# Patient Record
Sex: MALE | Race: WHITE | NOT HISPANIC OR LATINO | Employment: OTHER | URBAN - METROPOLITAN AREA
[De-identification: names, ages, dates, MRNs, and addresses within clinical notes are randomized per-mention and may not be internally consistent; named-entity substitution may affect disease eponyms.]

---

## 2023-05-28 DIAGNOSIS — G31.84 MILD COGNITIVE IMPAIRMENT OF UNCERTAIN OR UNKNOWN ETIOLOGY: ICD-10-CM

## 2023-05-31 RX ORDER — MEMANTINE HYDROCHLORIDE 10 MG/1
TABLET ORAL
Qty: 180 TABLET | Refills: 3 | Status: SHIPPED | OUTPATIENT
Start: 2023-05-31 | End: 2023-06-27 | Stop reason: ALTCHOICE

## 2023-06-27 ENCOUNTER — OFFICE VISIT (OUTPATIENT)
Dept: PRIMARY CARE | Facility: CLINIC | Age: 88
End: 2023-06-27
Payer: MEDICARE

## 2023-06-27 VITALS
BODY MASS INDEX: 20.05 KG/M2 | HEART RATE: 80 BPM | OXYGEN SATURATION: 95 % | DIASTOLIC BLOOD PRESSURE: 71 MMHG | WEIGHT: 128 LBS | SYSTOLIC BLOOD PRESSURE: 114 MMHG

## 2023-06-27 DIAGNOSIS — R64 CACHECTIC (MULTI): Primary | ICD-10-CM

## 2023-06-27 DIAGNOSIS — F02.B3 MODERATE DEMENTIA ASSOCIATED WITH OTHER UNDERLYING DISEASE, WITH MOOD DISTURBANCE (MULTI): ICD-10-CM

## 2023-06-27 DIAGNOSIS — N18.31 CKD STAGE G3A/A1, GFR 45-59 AND ALBUMIN CREATININE RATIO <30 MG/G (MULTI): ICD-10-CM

## 2023-06-27 PROCEDURE — 1159F MED LIST DOCD IN RCRD: CPT | Performed by: FAMILY MEDICINE

## 2023-06-27 PROCEDURE — 1036F TOBACCO NON-USER: CPT | Performed by: FAMILY MEDICINE

## 2023-06-27 PROCEDURE — 99213 OFFICE O/P EST LOW 20 MIN: CPT | Performed by: FAMILY MEDICINE

## 2023-06-27 RX ORDER — LEVOTHYROXINE SODIUM 50 UG/1
50 TABLET ORAL
COMMUNITY
End: 2023-10-08

## 2023-06-27 ASSESSMENT — PATIENT HEALTH QUESTIONNAIRE - PHQ9
SUM OF ALL RESPONSES TO PHQ9 QUESTIONS 1 AND 2: 0
1. LITTLE INTEREST OR PLEASURE IN DOING THINGS: NOT AT ALL
2. FEELING DOWN, DEPRESSED OR HOPELESS: NOT AT ALL

## 2023-06-27 NOTE — PROGRESS NOTES
Subjective   Ed Howe is a 88 y.o. male who presents for No chief complaint on file..  Seen in Jan 2023 for his MAW     HPI  Supplements : Centrum , Fish oils, Homocysteine, and Vit D  accompanied by wife, we spoke on the phone prior to his appointment  NKDA  Labs last on 9/23/19- 10/16/20- 5/25/22  - pt stopped all medication except levothyroxine, more often gets aggressive with swearing and occ throwing things    Denies chest pain pain or abdominal pains.     #) Alzheimers dementia - is declining   - pt could not recall his age, or the month or year   - wife is getting worried about the future   - is getting help from 2 daughters who live locally   - no self harm behaviors  - Mild cognitive impairment with cerebrovascular disease   - carotid stenosis   - follows with Dr. Duong, Neuro about one year ago and now with geriatric psychiatrist   - still doing ADLS  - on donepezil 10 mg daily - only taking 5 ...will add memantidine to the regiment -- STOPPED both of these a couple of weeks ago (weaned off)    #) Elevated PSA- urologist thinks it is ok  - no follow up needs, no longer checking PSA     #) Hypothyroidism   - is on the levothyroxine 50 mcg daily , getting every AM (chews it) but is taking daily.    Low appetite and weight loss    ROS was completed and all systems are negative with the exception of what was noted in the the HPI.     Objective     There were no vitals taken for this visit.     Physical Exam    Thin  Responds when asked but looks to wife to do the answering    Assessment/Plan   Problem List Items Addressed This Visit    None    Please continue the levothyroxine.     The blood pressure is good today     We reviewed the blood work from January, which looked good.     Your appetite might improve with stopping the memory medications.     Continue to get good sleep and stay on a routine.     Weight is up 3#     Please let me know if you need any more assistance in the home.     Follow up in 3  months or sooner as need.        Avani Denis DO, MSMed, ABOM  [address]   [address]   [place] [address]  Ph. [phone]  Fx. [phone]

## 2023-06-27 NOTE — PATIENT INSTRUCTIONS
Please continue the levothyroxine.     The blood pressure is good today     We reviewed the blood work from January, which looked good.     Your appetite might improve with stopping the memory medications.     Continue to get good sleep and stay on a routine.     Weight is up 3#     Please let me know if you need any more assistance in the home.     Follow up in 3 months or sooner as need.

## 2023-07-05 PROBLEM — N11.9 CHRONIC TUBULO-INTERSTITIAL NEPHRITIS: Status: ACTIVE | Noted: 2023-07-05

## 2023-07-05 PROBLEM — I67.9 DISTURBANCES OF VISION DUE TO CEREBROVASCULAR DISEASE: Status: ACTIVE | Noted: 2023-07-05

## 2023-07-05 PROBLEM — R63.4 WEIGHT LOSS, UNINTENTIONAL: Status: ACTIVE | Noted: 2023-07-05

## 2023-07-05 PROBLEM — R97.20 ELEVATED PSA: Status: ACTIVE | Noted: 2023-07-05

## 2023-07-05 PROBLEM — E78.5 DYSLIPIDEMIA: Status: ACTIVE | Noted: 2023-07-05

## 2023-07-05 PROBLEM — H53.9 DISTURBANCES OF VISION DUE TO CEREBROVASCULAR DISEASE: Status: ACTIVE | Noted: 2023-07-05

## 2023-07-05 PROBLEM — H90.3 SENSORINEURAL HEARING LOSS, BILATERAL: Status: ACTIVE | Noted: 2023-07-05

## 2023-07-05 PROBLEM — H93.13 TINNITUS OF BOTH EARS: Status: ACTIVE | Noted: 2023-07-05

## 2023-07-05 PROBLEM — I65.29 CAROTID ARTERY STENOSIS: Status: ACTIVE | Noted: 2023-07-05

## 2023-07-05 PROBLEM — E05.90 HYPERTHYROIDISM: Status: ACTIVE | Noted: 2023-07-05

## 2023-07-05 PROBLEM — I10 HYPERTENSION: Status: ACTIVE | Noted: 2023-07-05

## 2023-07-06 NOTE — PROGRESS NOTES
"So sorry for troubling you, there are most definitely less disturbing diagnosis for sure, they are likely have the same billing code and sometimes the computers suggest alternative verbiage.  The cachectic term is related to a person that is rapidly losing or has lost weight and is below a healthy BMI. My hops is as he gains some weight back this will no longer be the case. We we closely monitoring the weight loss though. The moderate dementia is not just \"Alzheimer's\" since he does have risk for small vessel/ vascular dementia from previous mild stroke, stenosis of the carotids, elevated blood pressure and cholesterol in the past and the chronic disease, this is what was meant by the \"associated with other underlying conditions\". I hope that helps.   "

## 2023-08-15 ENCOUNTER — CLINICAL SUPPORT (OUTPATIENT)
Dept: PRIMARY CARE | Facility: CLINIC | Age: 88
End: 2023-08-15
Payer: MEDICARE

## 2023-08-15 ENCOUNTER — TELEPHONE (OUTPATIENT)
Dept: PRIMARY CARE | Facility: CLINIC | Age: 88
End: 2023-08-15

## 2023-08-15 DIAGNOSIS — K04.7 TOOTH INFECTION: Primary | ICD-10-CM

## 2023-08-15 PROCEDURE — 96372 THER/PROPH/DIAG INJ SC/IM: CPT | Performed by: FAMILY MEDICINE

## 2023-08-15 RX ORDER — CEFTRIAXONE 1 G/1
1 INJECTION, POWDER, FOR SOLUTION INTRAMUSCULAR; INTRAVENOUS ONCE
Status: COMPLETED | OUTPATIENT
Start: 2023-08-15 | End: 2023-08-15

## 2023-08-15 RX ADMIN — CEFTRIAXONE 1 G: 1 INJECTION, POWDER, FOR SOLUTION INTRAMUSCULAR; INTRAVENOUS at 14:23

## 2023-08-15 NOTE — TELEPHONE ENCOUNTER
Pt's wife called asking for help.     States the dentist told her the pt has an infection in his tooth.     She has tried to make him take liquid and pills but due to the pt's dementia, he refuses and she is worried the infection will not get any better.     She was asking for an injection of an antibiotic, asking to be seen today.

## 2023-08-15 NOTE — PROGRESS NOTES
Patient came in today to receive Ceftriaxone in left buttock - tolerated well  Lot [id number]   Exp 5/2024   NDC [address]-46

## 2023-10-03 ENCOUNTER — APPOINTMENT (OUTPATIENT)
Dept: PRIMARY CARE | Facility: CLINIC | Age: 88
End: 2023-10-03
Payer: MEDICARE

## 2023-10-06 DIAGNOSIS — E05.90 THYROTOXICOSIS, UNSPECIFIED WITHOUT THYROTOXIC CRISIS OR STORM: ICD-10-CM

## 2023-10-08 RX ORDER — LEVOTHYROXINE SODIUM 50 UG/1
50 TABLET ORAL DAILY
Qty: 90 TABLET | Refills: 3 | Status: SHIPPED | OUTPATIENT
Start: 2023-10-08

## 2023-10-23 ENCOUNTER — OFFICE VISIT (OUTPATIENT)
Dept: PRIMARY CARE | Facility: CLINIC | Age: 88
End: 2023-10-23
Payer: MEDICARE

## 2023-10-23 VITALS
DIASTOLIC BLOOD PRESSURE: 74 MMHG | HEART RATE: 72 BPM | SYSTOLIC BLOOD PRESSURE: 128 MMHG | BODY MASS INDEX: 19.89 KG/M2 | OXYGEN SATURATION: 97 % | WEIGHT: 127 LBS

## 2023-10-23 DIAGNOSIS — I10 PRIMARY HYPERTENSION: ICD-10-CM

## 2023-10-23 DIAGNOSIS — E55.9 AVITAMINOSIS D: ICD-10-CM

## 2023-10-23 DIAGNOSIS — Z00.00 ROUTINE GENERAL MEDICAL EXAMINATION AT HEALTH CARE FACILITY: ICD-10-CM

## 2023-10-23 DIAGNOSIS — F02.B3 MODERATE DEMENTIA ASSOCIATED WITH OTHER UNDERLYING DISEASE, WITH MOOD DISTURBANCE (MULTI): Primary | ICD-10-CM

## 2023-10-23 DIAGNOSIS — E78.5 DYSLIPIDEMIA: ICD-10-CM

## 2023-10-23 DIAGNOSIS — N18.31 CKD STAGE G3A/A1, GFR 45-59 AND ALBUMIN CREATININE RATIO <30 MG/G (MULTI): ICD-10-CM

## 2023-10-23 PROCEDURE — 1159F MED LIST DOCD IN RCRD: CPT | Performed by: FAMILY MEDICINE

## 2023-10-23 PROCEDURE — 1160F RVW MEDS BY RX/DR IN RCRD: CPT | Performed by: FAMILY MEDICINE

## 2023-10-23 PROCEDURE — 1170F FXNL STATUS ASSESSED: CPT | Performed by: FAMILY MEDICINE

## 2023-10-23 PROCEDURE — 99214 OFFICE O/P EST MOD 30 MIN: CPT | Performed by: FAMILY MEDICINE

## 2023-10-23 PROCEDURE — 90662 IIV NO PRSV INCREASED AG IM: CPT | Performed by: FAMILY MEDICINE

## 2023-10-23 PROCEDURE — 1036F TOBACCO NON-USER: CPT | Performed by: FAMILY MEDICINE

## 2023-10-23 PROCEDURE — G0008 ADMIN INFLUENZA VIRUS VAC: HCPCS | Performed by: FAMILY MEDICINE

## 2023-10-23 PROCEDURE — 3074F SYST BP LT 130 MM HG: CPT | Performed by: FAMILY MEDICINE

## 2023-10-23 PROCEDURE — 3078F DIAST BP <80 MM HG: CPT | Performed by: FAMILY MEDICINE

## 2023-10-23 ASSESSMENT — ACTIVITIES OF DAILY LIVING (ADL)
BATHING: INDEPENDENT
DOING_HOUSEWORK: TOTAL CARE
GROCERY_SHOPPING: TOTAL CARE
TAKING_MEDICATION: TOTAL CARE
DRESSING: INDEPENDENT
MANAGING_FINANCES: TOTAL CARE

## 2023-10-23 ASSESSMENT — PATIENT HEALTH QUESTIONNAIRE - PHQ9
2. FEELING DOWN, DEPRESSED OR HOPELESS: NOT AT ALL
1. LITTLE INTEREST OR PLEASURE IN DOING THINGS: NOT AT ALL
SUM OF ALL RESPONSES TO PHQ9 QUESTIONS 1 AND 2: 0

## 2023-10-23 NOTE — PROGRESS NOTES
Subjective   Reason for Visit: Valentin Howe is an 88 y.o. male here for a Medicare Wellness visit.      Reviewed all medications by prescribing practitioner or clinical pharmacist (such as prescriptions, OTCs, herbal therapies and supplements) and documented in the medical record.    HPI  Supplements : Centrum , Fish oils, Homocysteine, and Vit D  accompanied by wife, we spoke on the phone prior to his appointment  NKDA  Labs last on 9/23/19- 10/16/20- 5/25/22  - pt stopped all medication except levothyroxine, more often gets aggressive with swearing and occ throwing things    Denies chest pain pain or abdominal pains.     #) Alzheimers dementia - is declining   - pt could not recall his age, or the month or year   - wife is getting worried about the future   - is getting help from 2 daughters who live locally   - no self harm behaviors  - Mild cognitive impairment with cerebrovascular disease   - carotid stenosis   - follows with Dr. Duong, Neuro about one year ago and now with geriatric psychiatrist   - still doing ADLS  - on donepezil 10 mg daily - only taking 5 ...will add memantidine to the regiment -- STOPPED both of these a couple of weeks ago (weaned off)    #) Elevated PSA- urologist thinks it is ok  - no follow up needs, no longer checking PSA     #) Hypothyroidism   - is on the levothyroxine 50 mcg daily , getting every AM (chews it) but is taking daily.    Low appetite and weight loss    Patient Care Team:  Avani Denis DO as PCP - General  Avani Denis DO as PCP - [employer] PCP  Tyler Granados MD (Internal Medicine)     Review of Systems  ROS was completed and all systems are negative with the exception of what was noted in the the HPI.     Objective   Vitals:  /74   Pulse 72   Wt 57.6 kg (127 lb)   SpO2 97%   BMI 19.89 kg/m²       Physical Exam  Thin  Responds when asked but looks to wife to do the answering    Assessment/Plan   Problem List Items Addressed This  Visit    None

## 2023-10-23 NOTE — PATIENT INSTRUCTIONS
Please continue the levothyroxine.     The blood pressure is good today , 128/74    We reviewed the blood work from January, 2023  which looked good.     Your appetite might improve with stopping the memory medications.   Weight is stable which is great     Continue to get good sleep and stay on a routine.     Repeat fasting blood work in January 2024  ( when the weather is nice)     High dose flu shot today.     Please let me know if you need any more assistance in the home.     Follow up in 4 months or sooner as need.

## 2023-11-07 DIAGNOSIS — F02.B3 MODERATE DEMENTIA ASSOCIATED WITH OTHER UNDERLYING DISEASE, WITH MOOD DISTURBANCE (MULTI): Primary | ICD-10-CM

## 2023-11-07 NOTE — PROGRESS NOTES
"Daughter was seen for her visit   Shared with me some concerns regarding her father  She also shared with me that she does nto think her mother is currently in danger, but that could change.     She writes:   \" In his mind, he's in his twenties, usually remembers he's  but doesn't remember he has children. He recognizes daughters as being familiar somehow, but doesn't know their names, etc. He remembers his siblings but not their spouses, children, etc. \"     \"When his brother  last year, he was devastated every time someone told him. He couldn't remember for more than a few minutes\" it was like he was told for the first time every time.     \"He constantly follows my mom around the house. He is verbally abusive to my mom, using the f- word all the time when yelling at her, even in public. He never yelled at her in public before. He doesn't yet at her when the children are there. \"     \" He throws things and swears when he gets frustrated. So far, not at my mom.\"     \"He is paranoid at someone stealing the car and wont go for walks anymore t not for long so he can get back to the car.\"    \" He gets vary nervous away from home, even when my mom is with him.\"     \"He puts things in the fridge/freezer that don't belong there and puts things that belong in the fridge in cabinets.\"     \" He has started putting potato chips in his drinks and then saying his lemonade doesn't taste good.\"     \"He enjoys music, which my mom plays for him a lot but he is very frequently grumpy to agitated.\"     \" He was going to use his razor to brush his teeth. HE has an electric razor now, but my mom has to help him and he doesn't like it.\"    \"Has a history of childhood trauma. Was a refugee during WWII, Going back in his mind to his teens/twenties is a time of turmoil when he was a new immigrant in the [place].\"     \" My mom is afraid to bring a caregiver into the home due to his paranoia at someone breaking in/stealing. He can't fo " "to sleep at night without locking up several times. \"   "

## 2023-12-28 ENCOUNTER — APPOINTMENT (OUTPATIENT)
Dept: CARDIOLOGY | Facility: HOSPITAL | Age: 88
End: 2023-12-28
Payer: MEDICARE

## 2023-12-28 ENCOUNTER — HOSPITAL ENCOUNTER (EMERGENCY)
Facility: HOSPITAL | Age: 88
Discharge: HOME | End: 2023-12-28
Payer: MEDICARE

## 2023-12-28 ENCOUNTER — APPOINTMENT (OUTPATIENT)
Dept: RADIOLOGY | Facility: HOSPITAL | Age: 88
End: 2023-12-28
Payer: MEDICARE

## 2023-12-28 VITALS
HEIGHT: 69 IN | WEIGHT: 127.43 LBS | HEART RATE: 69 BPM | RESPIRATION RATE: 17 BRPM | SYSTOLIC BLOOD PRESSURE: 145 MMHG | OXYGEN SATURATION: 99 % | BODY MASS INDEX: 18.87 KG/M2 | DIASTOLIC BLOOD PRESSURE: 89 MMHG | TEMPERATURE: 98.1 F

## 2023-12-28 DIAGNOSIS — S09.90XA INJURY OF HEAD, INITIAL ENCOUNTER: ICD-10-CM
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
## 2023-12-28 DIAGNOSIS — R91.8 INFILTRATE OF LUNG PRESENT ON CHEST X-RAY: Primary | ICD-10-CM

## 2023-12-28 DIAGNOSIS — S02.2XXA CLOSED FRACTURE OF NASAL BONE, INITIAL ENCOUNTER: ICD-10-CM

## 2023-12-28 LAB
ANION GAP SERPL CALC-SCNC: 8 MMOL/L
APPEARANCE UR: CLEAR
BASOPHILS # BLD AUTO: 0.03 X10*3/UL (ref 0–0.1)
BASOPHILS NFR BLD AUTO: 0.5 %
BILIRUB UR STRIP.AUTO-MCNC: NEGATIVE MG/DL
BUN SERPL-MCNC: 24 MG/DL (ref 8–25)
CALCIUM SERPL-MCNC: 9.4 MG/DL (ref 8.5–10.4)
CHLORIDE SERPL-SCNC: 105 MMOL/L (ref 97–107)
CO2 SERPL-SCNC: 28 MMOL/L (ref 24–31)
COLOR UR: COLORLESS
CREAT SERPL-MCNC: 1.4 MG/DL (ref 0.4–1.6)
EOSINOPHIL # BLD AUTO: 0.12 X10*3/UL (ref 0–0.4)
EOSINOPHIL NFR BLD AUTO: 2.1 %
ERYTHROCYTE [DISTWIDTH] IN BLOOD BY AUTOMATED COUNT: 13.8 % (ref 11.5–14.5)
GFR SERPL CREATININE-BSD FRML MDRD: 49 ML/MIN/1.73M*2
GLUCOSE SERPL-MCNC: 111 MG/DL (ref 65–99)
GLUCOSE UR STRIP.AUTO-MCNC: NORMAL MG/DL
HCT VFR BLD AUTO: 41 % (ref 41–52)
HGB BLD-MCNC: 14.4 G/DL (ref 13.5–17.5)
IMM GRANULOCYTES # BLD AUTO: 0.02 X10*3/UL (ref 0–0.5)
IMM GRANULOCYTES NFR BLD AUTO: 0.4 % (ref 0–0.9)
KETONES UR STRIP.AUTO-MCNC: NEGATIVE MG/DL
LEUKOCYTE ESTERASE UR QL STRIP.AUTO: NEGATIVE
LYMPHOCYTES # BLD AUTO: 1.54 X10*3/UL (ref 0.8–3)
LYMPHOCYTES NFR BLD AUTO: 27.3 %
MCH RBC QN AUTO: 33.1 PG (ref 26–34)
MCHC RBC AUTO-ENTMCNC: 35.1 G/DL (ref 32–36)
MCV RBC AUTO: 94 FL (ref 80–100)
MONOCYTES # BLD AUTO: 0.37 X10*3/UL (ref 0.05–0.8)
MONOCYTES NFR BLD AUTO: 6.5 %
NEUTROPHILS # BLD AUTO: 3.57 X10*3/UL (ref 1.6–5.5)
NEUTROPHILS NFR BLD AUTO: 63.2 %
NITRITE UR QL STRIP.AUTO: NEGATIVE
NRBC BLD-RTO: 0 /100 WBCS (ref 0–0)
PH UR STRIP.AUTO: 7 [PH]
PLATELET # BLD AUTO: 168 X10*3/UL (ref 150–450)
POTASSIUM SERPL-SCNC: 4.2 MMOL/L (ref 3.4–5.1)
PROT UR STRIP.AUTO-MCNC: NEGATIVE MG/DL
RBC # BLD AUTO: 4.35 X10*6/UL (ref 4.5–5.9)
RBC # UR STRIP.AUTO: NEGATIVE /UL
SODIUM SERPL-SCNC: 141 MMOL/L (ref 133–145)
SP GR UR STRIP.AUTO: 1.01
UROBILINOGEN UR STRIP.AUTO-MCNC: NORMAL MG/DL
WBC # BLD AUTO: 5.7 X10*3/UL (ref 4.4–11.3)

## 2023-12-28 PROCEDURE — 85025 COMPLETE CBC W/AUTO DIFF WBC: CPT | Performed by: PHYSICIAN ASSISTANT

## 2023-12-28 PROCEDURE — 76377 3D RENDER W/INTRP POSTPROCES: CPT

## 2023-12-28 PROCEDURE — 36415 COLL VENOUS BLD VENIPUNCTURE: CPT | Performed by: PHYSICIAN ASSISTANT

## 2023-12-28 PROCEDURE — 70486 CT MAXILLOFACIAL W/O DYE: CPT

## 2023-12-28 PROCEDURE — 71046 X-RAY EXAM CHEST 2 VIEWS: CPT

## 2023-12-28 PROCEDURE — 80048 BASIC METABOLIC PNL TOTAL CA: CPT | Performed by: PHYSICIAN ASSISTANT

## 2023-12-28 PROCEDURE — 99285 EMERGENCY DEPT VISIT HI MDM: CPT

## 2023-12-28 PROCEDURE — 81003 URINALYSIS AUTO W/O SCOPE: CPT | Performed by: PHYSICIAN ASSISTANT

## 2023-12-28 PROCEDURE — 70450 CT HEAD/BRAIN W/O DYE: CPT

## 2023-12-28 PROCEDURE — 93005 ELECTROCARDIOGRAM TRACING: CPT

## 2023-12-28 RX ORDER — DOXYCYCLINE 100 MG/1
100 CAPSULE ORAL 2 TIMES DAILY
Qty: 20 CAPSULE | Refills: 0 | Status: SHIPPED | OUTPATIENT
Start: 2023-12-28 | End: 2024-01-07

## 2023-12-28 ASSESSMENT — PAIN - FUNCTIONAL ASSESSMENT: PAIN_FUNCTIONAL_ASSESSMENT: 0-10

## 2023-12-28 ASSESSMENT — PAIN SCALES - GENERAL: PAINLEVEL_OUTOF10: 0 - NO PAIN

## 2023-12-28 NOTE — ED PROVIDER NOTES
HPI   Chief Complaint   Patient presents with    Fall     Patient brought in for complaints of a fall that occurred this morning, patient denies loc but does have deformity to his nose, family wanted to ensure nothing was broken       88-year-old male presented emergency department the chief medical.  He stood up out of bed and stumbled and lost his balance and fell forward striking his nose.  He has a deformity to the nose.  He is not anticoagulated.  Believes his tetanus is up-to-date.  He denies lightheadedness dizziness chest pain shortness of breath numbness weakness.  Denies abdominal pain or dysuria.  Denies cough or recent illness.  No other complaint                          [place] Coma Scale Score: 15                  Patient History   Past Medical History:   Diagnosis Date    Chronic rhinitis     Rhinitis    Other conditions influencing health status     Rotator Cuff Tendon Tear    Other conditions influencing health status     Amputated Fingers    Other conditions influencing health status 10/04/2013    Stroke syndrome    Other conditions influencing health status     Retinal Vessels - Arterial Embolus    Other specified symptoms and signs involving the circulatory and respiratory systems 10/04/2013    Cardiovascular symptoms    Personal history of other diseases of the circulatory system 10/04/2013    History of hypertension    Personal history of other diseases of the nervous system and sense organs 10/04/2013    History of migraine headaches    Personal history of other endocrine, nutritional and metabolic disease 10/04/2013    History of hyperlipidemia    Personal history of other specified conditions 10/04/2013    History of chest pain    Plantar fascial fibromatosis     Plantar fasciitis     Past Surgical History:   Procedure Laterality Date    CAROTID ENDARTERECTOMY  10/03/2013    Carotid Thromboendarterectomy    CT ANGIO NECK W  8/30/2012    CT NECK ANGIO W AND WO IV CONTRAST 8/30/2012 [facility]  ANCILLARY LEGACY    CT HEAD ANGIO W AND WO IV CONTRAST  8/30/2012    CT HEAD ANGIO W AND WO IV CONTRAST 8/30/2012 [facility]CILLARY [facility]GAC[facility]    OTHER SURGICAL HISTORY  06/03/2021    Hernia repair    OTHER SURGICAL HISTORY  06/03/2021    Finger surgical procedure     No family history on file.  Social History     Tobacco Use    Smoking status: Former     Types: Cigarettes    Smokeless tobacco: Never   Vaping Use    Vaping Use: Never used   Substance Use Topics    Alcohol use: Never    Drug use: Never       Physical Exam   ED Triage Vitals [12/28/23 0902]   Temp Heart Rate Resp BP   36.7 °C (98.1 °F) 69 18 145/89      SpO2 Temp Source Heart Rate Source Patient Position   97 % Oral Monitor Sitting      BP Location FiO2 (%)     Left arm --       Physical Exam  Vitals and nursing note reviewed.   Constitutional:       Appearance: Normal appearance.   HENT:      Head: Normocephalic and atraumatic.      Nose: Nose normal.      Mouth/Throat:      Mouth: Mucous membranes are moist.   Cardiovascular:      Rate and Rhythm: Normal rate and regular rhythm.   Pulmonary:      Effort: Pulmonary effort is normal.      Breath sounds: Normal breath sounds.   Abdominal:      General: Abdomen is flat.      Palpations: Abdomen is soft.   Musculoskeletal:         General: Normal range of motion.   Skin:     General: Skin is warm.   Neurological:      General: No focal deficit present.      Mental Status: He is alert and oriented to person, place, and time.         ED Course & MDM   Diagnoses as of 12/28/23 1214   Infiltrate of lung present on chest x-ray   Fall, initial encounter   Injury of head, initial encounter   Closed fracture of nasal bone, initial encounter       Medical Decision Making  I have seen and evaluated this patient.  Physician available for consultation.  Vital signs have been reviewed.  All laboratory and diagnostic imaging is reviewed by myself and interpreted by myself unless otherwise stated.  Additionally imaging is  interpreted by radiologist.    CBC without significant leukocytosis or anemia, metabolic panel without significant renal impairment or electrolyte abnormality.  Chest x-ray with infiltrate on x-ray.  Patient's family states this is a chronic finding.  Patient has no clinical evidence of pneumonia.  He is not hypoxic no fever no white count.  We are putting on a prophylactic antibiotic given the nasal fractures with septal defects so will place on doxycycline for lung and nasal coverage.    EKG reviewed by physician interpreted by myself demonstrates a normal sinus rhythm with a ventricular to 60 bpm, OH interval 130, QRS 74, QTc 421, normal axis, no ST segment elevation    CT negative for intracranial abnormality, CT maxillofacial demonstrates nasal fracture.  Patient steady on his feet.  Family feels comfortable with plan of discharge.  Released in good condition with oral maxillofacial follow-up.    Labs Reviewed  CBC WITH AUTO DIFFERENTIAL - Abnormal     WBC                           5.7                    nRBC                          0.0                    RBC                           4.35 (*)               Hemoglobin                    14.4                   Hematocrit                    41.0                   MCV                           94                     MCH                           33.1                   MCHC                          35.1                   RDW                           13.8                   Platelets                     168                    Neutrophils %                 63.2                   Immature Granulocytes %, Automated   0.4                    Lymphocytes %                 27.3                   Monocytes %                   6.5                    Eosinophils %                 2.1                    Basophils %                   0.5                    Neutrophils Absolute          3.57                   Immature Granulocytes Absolute, Au*   0.02                    Lymphocytes Absolute          1.54                   Monocytes Absolute            0.37                   Eosinophils Absolute          0.12                   Basophils Absolute            0.03                BASIC METABOLIC PANEL - Abnormal     Glucose                       111 (*)                Sodium                        141                    Potassium                     4.2                    Chloride                      105                    Bicarbonate                   28                     Urea Nitrogen                 24                     Creatinine                    1.40                   eGFR                          49 (*)                 Calcium                       9.4                    Anion Gap                     8                   URINALYSIS WITH REFLEX CULTURE AND MICROSCOPIC - Abnormal     Color, Urine                  Colorless (*)               Appearance, Urine             Clear                  Specific Gravity, Urine       1.008                  pH, Urine                     7.0                    Protein, Urine                NEGATIVE                Glucose, Urine                Normal                 Blood, Urine                  NEGATIVE                Ketones, Urine                NEGATIVE                Bilirubin, Urine              NEGATIVE                Urobilinogen, Urine           Normal                 Nitrite, Urine                NEGATIVE                Leukocyte Esterase, Urine     NEGATIVE             URINALYSIS WITH REFLEX CULTURE AND MICROSCOPIC         Narrative: The following orders were created for panel order Urinalysis with Reflex Culture and Microscopic.                  Procedure                               Abnormality         Status                                     ---------                               -----------         ------                                     Urinalysis with Reflex C...[[id number]]  Abnormal            Final result                                Extra Urine Gray Tube[[id number]]                                                                                         Please view results for these tests on the individual orders.  EXTRA URINE GRAY TUBE  XR chest 2 views   Final Result    Patchy infiltrate right lower lobe suspicious for pneumonia.    Follow-up to assure clearing is recommended. Infiltrate is new    compared to the prior study.          MACRO:    none          Signed by: Juan Diego Clark 12/28/2023 11:24 AM    Dictation workstation:   [id number]     CT maxillofacial bones wo IV contrast   Final Result    Acute comminuted fractures of the lateral nasal bones with    displacement to the left of midline.          Signed by: Joann Self 12/28/2023 10:20 AM    Dictation workstation:   [id number]     CT head wo IV contrast   Final Result    Atrophy and chronic microvascular ischemic disease with a small    remote infarct of the left centrum semiovale and with old lacunar    infarcts of the basal ganglia bilaterally.          No acute intracranial process.          MACRO:    None                Signed by: Joann Self 12/28/2023 10:23 AM    Dictation workstation:   [id number]     CT 3D reconstruction   Final Result    Acute comminuted fractures of the lateral nasal bones with    displacement to the left of midline.          Signed by: Joann Self 12/28/2023 10:20 AM    Dictation workstation:   [id number]              Your medication list        START taking these medications        Instructions Last Dose Given Next Dose Due   doxycycline 100 mg capsule  Commonly known as: Vibramycin      Take 1 capsule (100 mg) by mouth 2 times a day for 10 days. Take with at least 8 ounces (large glass) of water, do not lie down for 30 minutes after              ASK your doctor about these medications        Instructions Last Dose Given Next Dose Due   levothyroxine 50 mcg tablet  Commonly known as: Synthroid, Levoxyl      TAKE 1 TABLET BY MOUTH  EVERY DAY                 Where to Get Your Medications        These medications were sent to [facility]/pharmacy #[id number] - [place] - [address] [address]      Phone: [phone]   doxycycline 100 mg capsule           Procedure  Procedures     Norbert Naidu PA-C  12/28/23 1211

## 2023-12-28 NOTE — DISCHARGE INSTRUCTIONS
You were seen by Norbert Naidu PA-C    Please return if any worsening or change and symptoms    Take Medications as prescribed

## 2023-12-31 LAB
ATRIAL RATE: 68 BPM
P AXIS: 77 DEGREES
P OFFSET: 197 MS
P ONSET: 158 MS
PR INTERVAL: 130 MS
Q ONSET: 223 MS
QRS COUNT: 11 BEATS
QRS DURATION: 74 MS
QT INTERVAL: 396 MS
QTC CALCULATION(BAZETT): 421 MS
QTC FREDERICIA: 413 MS
R AXIS: 66 DEGREES
T AXIS: 81 DEGREES
T OFFSET: 421 MS
VENTRICULAR RATE: 68 BPM

## 2024-01-04 ENCOUNTER — OFFICE VISIT (OUTPATIENT)
Dept: PRIMARY CARE | Facility: CLINIC | Age: 89
End: 2024-01-04
Payer: MEDICARE

## 2024-01-04 VITALS
SYSTOLIC BLOOD PRESSURE: 130 MMHG | OXYGEN SATURATION: 94 % | DIASTOLIC BLOOD PRESSURE: 78 MMHG | HEIGHT: 69 IN | BODY MASS INDEX: 19.26 KG/M2 | WEIGHT: 130 LBS | HEART RATE: 50 BPM

## 2024-01-04 DIAGNOSIS — S02.2XXA CLOSED FRACTURE OF NASAL BONE, INITIAL ENCOUNTER: Primary | ICD-10-CM

## 2024-01-04 PROCEDURE — 1036F TOBACCO NON-USER: CPT | Performed by: FAMILY MEDICINE

## 2024-01-04 PROCEDURE — 99214 OFFICE O/P EST MOD 30 MIN: CPT | Performed by: FAMILY MEDICINE

## 2024-01-04 PROCEDURE — 1126F AMNT PAIN NOTED NONE PRSNT: CPT | Performed by: FAMILY MEDICINE

## 2024-01-04 PROCEDURE — 3078F DIAST BP <80 MM HG: CPT | Performed by: FAMILY MEDICINE

## 2024-01-04 PROCEDURE — 1159F MED LIST DOCD IN RCRD: CPT | Performed by: FAMILY MEDICINE

## 2024-01-04 PROCEDURE — 3075F SYST BP GE 130 - 139MM HG: CPT | Performed by: FAMILY MEDICINE

## 2024-01-04 ASSESSMENT — PATIENT HEALTH QUESTIONNAIRE - PHQ9
2. FEELING DOWN, DEPRESSED OR HOPELESS: NOT AT ALL
SUM OF ALL RESPONSES TO PHQ9 QUESTIONS 1 AND 2: 0
1. LITTLE INTEREST OR PLEASURE IN DOING THINGS: NOT AT ALL

## 2024-01-04 NOTE — PROGRESS NOTES
"Subjective   Reason for Visit: Valentin Howe is an 88 y.o. male here for a Medicare Wellness visit.      Reviewed all medications by prescribing practitioner or clinical pharmacist (such as prescriptions, OTCs, herbal therapies and supplements) and documented in the medical record.    HPI  Supplements : Centrum , Fish oils, Homocysteine, and Vit D  accompanied by wife, we spoke on the phone prior to his appointment  NKDA  Labs last on 9/23/19- 10/16/20- 5/25/22  - pt stopped all medication except levothyroxine, more often gets aggressive with swearing and occ throwing things    #) fall   - went to the ER on 12/28/23  - nose fracture   - He stood up out of bed and stumbled and lost his balance and fell forward striking his nose.  He has a deformity to the nose.   - reviewed imaging +      #) Alzheimers dementia - is declining   - pt could not recall his age, or the month or year   - wife is getting worried about the future   - is getting help from 2 daughters who live locally   - no self harm behaviors  - Mild cognitive impairment with cerebrovascular disease   - carotid stenosis   - follows with Dr. Duong, Neuro about one year ago and now with geriatric psychiatrist   - still doing ADLS  - on donepezil 10 mg daily - only taking 5 ...will add memantidine to the regiment -- STOPPED both of these a couple of weeks ago (weaned off)    #) Elevated PSA- urologist thinks it is ok  - no follow up needs, no longer checking PSA     #) Hypothyroidism   - is on the levothyroxine 50 mcg daily , getting every AM (chews it) but is taking daily.    Low appetite and weight loss    Patient Care Team:  Avani Denis DO as PCP - General  Avani Denis DO as PCP - [employer] PCP  Tyler Granados MD (Internal Medicine)     Review of Systems  ROS was completed and all systems are negative with the exception of what was noted in the the HPI.     Objective   Vitals:  /78   Pulse 50   Ht 1.753 m (5' 9\")   Wt 59 " kg (130 lb)   SpO2 94%   BMI 19.20 kg/m²       Physical Exam  Thin  Responds when asked but looks to wife to do the answering  Left maxillary bruise  Swelling at the nasal bridge.     Assessment/Plan   Problem List Items Addressed This Visit    None    It appears the nose is healing as expected    No need for any more antibiotics    Call the geriatric psychiatrist for an assessment.     We reviewed the imaging and labs from the ER, no need for more at this time.     Follow up in 4 months ( reschedule the feb 2024 date) or sooner as need.

## 2024-01-04 NOTE — PATIENT INSTRUCTIONS
It appears the nose is healing as expected    No need for any more antibiotics    Call the geriatric psychiatrist for an assessment.     We reviewed the imaging and labs from the ER, no need for more at this time.     Follow up in 4 months ( reschedule the feb 2024 date) or sooner as need.

## 2024-02-23 ENCOUNTER — APPOINTMENT (OUTPATIENT)
Dept: PRIMARY CARE | Facility: CLINIC | Age: 89
End: 2024-02-23
Payer: MEDICARE

## 2024-02-26 DIAGNOSIS — F02.B3 MODERATE DEMENTIA ASSOCIATED WITH OTHER UNDERLYING DISEASE, WITH MOOD DISTURBANCE (MULTI): Primary | ICD-10-CM

## 2024-02-26 DIAGNOSIS — R45.4 IRRITABILITY AND ANGER: ICD-10-CM

## 2024-02-28 ENCOUNTER — APPOINTMENT (OUTPATIENT)
Dept: BEHAVIORAL HEALTH | Facility: CLINIC | Age: 89
End: 2024-02-28
Payer: MEDICARE

## 2024-03-11 ENCOUNTER — TELEMEDICINE (OUTPATIENT)
Dept: PHARMACY | Facility: HOSPITAL | Age: 89
End: 2024-03-11
Payer: MEDICARE

## 2024-03-11 DIAGNOSIS — R45.4 IRRITABILITY AND ANGER: ICD-10-CM

## 2024-03-11 DIAGNOSIS — F02.B3 MODERATE DEMENTIA ASSOCIATED WITH OTHER UNDERLYING DISEASE, WITH MOOD DISTURBANCE (MULTI): ICD-10-CM

## 2024-03-11 RX ORDER — ARIPIPRAZOLE 2 MG/1
2 TABLET ORAL DAILY
Qty: 30 TABLET | Refills: 0 | Status: SHIPPED | OUTPATIENT
Start: 2024-03-11 | End: 2024-04-10 | Stop reason: SDDI

## 2024-03-11 NOTE — PROGRESS NOTES
"[facility]  Valentin Howe \"Herb\" is a 88 y.o. male was referred to Clinical Pharmacy Team for agitation/aggression associated with dementia.    PMH dementia with mood disturbance. Virtual visit conducted with Valentin's wife and caregiver Sowmya.  Worsening dementia, periodic episodes of aggressive outbursts and may throw objects. PCP would like to start aripiprazole however Sowmya is concerned regarding administration of a new medication.  Patient has difficulty taking oral medications. He takes levothyroxine during morning routine (chews and takes a few sips of water)  Likes applesauce but does not accept it with crushed meds. Picky eater - cream of wheat in morning. Snacks on potato chips, cheese, chocolate. Likes watered down lemonade, sometimes tea.     Referring Provider: Avani Denis, DO    MEDICATION INITIATION ASSESSMENT  CURRENT PHARMACOTHERAPY  - None     HISTORICAL PHARMACOTHERAPY  - None     LAB REVIEW  Glucose (mg/dL)   Date Value   12/28/2023 111 (H)   01/26/2023 93   05/25/2022 98   05/05/2021 89     Bicarbonate (mmol/L)   Date Value   12/28/2023 28   01/26/2023 31   05/25/2022 29   05/05/2021 30     Urea Nitrogen (mg/dL)   Date Value   12/28/2023 24   01/26/2023 29 (H)   05/25/2022 27 (H)   05/05/2021 23     Creatinine (mg/dL)   Date Value   12/28/2023 1.40   01/26/2023 1.58 (H)   05/25/2022 1.63 (H)   05/05/2021 1.43 (H)     Lab Results   Component Value Date    CHOL 166 01/26/2023    CHOL 163 05/25/2022    CHOL 176 05/05/2021     Lab Results   Component Value Date    HDL 29.8 (A) 01/26/2023    HDL 30.6 (A) 05/25/2022    HDL 28.0 (A) 05/05/2021     No results found for: \"LDLCALC\"  Lab Results   Component Value Date    TRIG 160 (H) 01/26/2023    TRIG 143 05/25/2022    TRIG 246 (H) 05/05/2021     PATIENT EDUCATION/DISCUSSION:  - Counseled patient on MOA, expectations, side effects, duration of therapy, contraindications, administration, and monitoring parameters  - Answered all " patient questions and concerns    PLAN  1. START aripiprazole 2mg once daily. Crush and serve with chocolate syrup or icecream.   Prefers to avoid oral solution. ODT available at higher doses.  2. Prescription sent to local pharmacy per request.    Clinical Pharmacist follow-up: 2 weeks   PCP follow-up: 5/13/24    Thank you,   Mariel Bernal, PharmD    Continue all meds under the continuation of care with the referring provider and clinical pharmacy team.  Verbal consent to manage patient's drug therapy was obtained.They were informed they may decline to participate or withdraw from participation in pharmacy services at any time.

## 2024-04-10 ENCOUNTER — TELEMEDICINE (OUTPATIENT)
Dept: PHARMACY | Facility: HOSPITAL | Age: 89
End: 2024-04-10
Payer: MEDICARE

## 2024-04-10 DIAGNOSIS — F02.B3 MODERATE DEMENTIA ASSOCIATED WITH OTHER UNDERLYING DISEASE, WITH MOOD DISTURBANCE (MULTI): ICD-10-CM

## 2024-04-10 DIAGNOSIS — R45.4 IRRITABILITY AND ANGER: ICD-10-CM

## 2024-04-10 RX ORDER — ARIPIPRAZOLE 10 MG/1
TABLET, ORALLY DISINTEGRATING ORAL
Qty: 30 TABLET | Refills: 0 | Status: SHIPPED | OUTPATIENT
Start: 2024-04-10

## 2024-04-10 NOTE — PROGRESS NOTES
"[facility]  Valentin Howe \"Herb\" is a 89 y.o. male was referred to Clinical Pharmacy Team for agitation/aggression associated with dementia.    PMH dementia with mood disturbance. Virtual visit conducted with Valentin's wife and caregiver Sowmya.  Worsening dementia, periodic episodes of aggressive outbursts and may throw objects.   Patient has difficulty taking oral medications. He takes levothyroxine during morning routine (chews and takes a few sips of water)  Likes applesauce but does not accept it with crushed meds. Picky eater - cream of wheat in morning. Snacks on potato chips, cheese, chocolate. Likes watered down lemonade, sometimes tea.     Since last visit, aripiprazole initiated however administration refused by patient. May prefer chewable.    Referring Provider: Avani Denis,     MEDICATION INITIATION ASSESSMENT  CURRENT PHARMACOTHERAPY  - None     HISTORICAL PHARMACOTHERAPY  - None     LAB REVIEW  Glucose (mg/dL)   Date Value   12/28/2023 111 (H)   01/26/2023 93   05/25/2022 98   05/05/2021 89     Bicarbonate (mmol/L)   Date Value   12/28/2023 28   01/26/2023 31   05/25/2022 29   05/05/2021 30     Urea Nitrogen (mg/dL)   Date Value   12/28/2023 24   01/26/2023 29 (H)   05/25/2022 27 (H)   05/05/2021 23     Creatinine (mg/dL)   Date Value   12/28/2023 1.40   01/26/2023 1.58 (H)   05/25/2022 1.63 (H)   05/05/2021 1.43 (H)     Lab Results   Component Value Date    CHOL 166 01/26/2023    CHOL 163 05/25/2022    CHOL 176 05/05/2021     Lab Results   Component Value Date    HDL 29.8 (A) 01/26/2023    HDL 30.6 (A) 05/25/2022    HDL 28.0 (A) 05/05/2021     No results found for: \"LDLCALC\"  Lab Results   Component Value Date    TRIG 160 (H) 01/26/2023    TRIG 143 05/25/2022    TRIG 246 (H) 05/05/2021     PATIENT EDUCATION/DISCUSSION:  - Counseled patient on MOA, expectations, side effects, duration of therapy, contraindications, administration, and monitoring parameters  - Answered all " patient questions and concerns    PLAN  1. START aripiprazole ODT 5mg (1/2 tablet) once daily as needed for agitation   DISCONTINUE aripiprazole 2mg tabs (non-adherence)  2. Prescription sent to local pharmacy per request    Clinical Pharmacist follow-up: 1 month  PCP follow-up: 5/13/24    Thank you,   Mariel Bernal, PharmD    Continue all meds under the continuation of care with the referring provider and clinical pharmacy team.  Verbal consent to manage patient's drug therapy was obtained.They were informed they may decline to participate or withdraw from participation in pharmacy services at any time.

## 2024-05-10 ENCOUNTER — APPOINTMENT (OUTPATIENT)
Dept: PRIMARY CARE | Facility: CLINIC | Age: 89
End: 2024-05-10
Payer: MEDICARE

## 2024-05-13 ENCOUNTER — LAB (OUTPATIENT)
Dept: LAB | Facility: LAB | Age: 89
End: 2024-05-13
Payer: MEDICARE

## 2024-05-13 ENCOUNTER — OFFICE VISIT (OUTPATIENT)
Dept: PRIMARY CARE | Facility: CLINIC | Age: 89
End: 2024-05-13
Payer: MEDICARE

## 2024-05-13 VITALS
DIASTOLIC BLOOD PRESSURE: 70 MMHG | BODY MASS INDEX: 19.05 KG/M2 | OXYGEN SATURATION: 97 % | WEIGHT: 129 LBS | HEART RATE: 69 BPM | SYSTOLIC BLOOD PRESSURE: 124 MMHG

## 2024-05-13 DIAGNOSIS — E78.5 DYSLIPIDEMIA: Primary | ICD-10-CM

## 2024-05-13 DIAGNOSIS — F02.B3 MODERATE DEMENTIA ASSOCIATED WITH OTHER UNDERLYING DISEASE, WITH MOOD DISTURBANCE (MULTI): ICD-10-CM

## 2024-05-13 DIAGNOSIS — E78.5 DYSLIPIDEMIA: ICD-10-CM

## 2024-05-13 DIAGNOSIS — E55.9 AVITAMINOSIS D: ICD-10-CM

## 2024-05-13 DIAGNOSIS — H90.3 SENSORINEURAL HEARING LOSS, BILATERAL: ICD-10-CM

## 2024-05-13 DIAGNOSIS — I10 PRIMARY HYPERTENSION: ICD-10-CM

## 2024-05-13 DIAGNOSIS — N18.31 CKD STAGE G3A/A1, GFR 45-59 AND ALBUMIN CREATININE RATIO <30 MG/G (MULTI): ICD-10-CM

## 2024-05-13 PROBLEM — K57.30 DIVERTICULOSIS OF COLON: Status: ACTIVE | Noted: 2024-05-13

## 2024-05-13 PROBLEM — K21.9 ESOPHAGEAL REFLUX: Status: ACTIVE | Noted: 2024-05-13

## 2024-05-13 PROBLEM — E78.00 PURE HYPERCHOLESTEROLEMIA: Status: ACTIVE | Noted: 2017-01-20

## 2024-05-13 LAB
ALBUMIN SERPL BCP-MCNC: 4.1 G/DL (ref 3.4–5)
ALP SERPL-CCNC: 68 U/L (ref 33–136)
ALT SERPL W P-5'-P-CCNC: 13 U/L (ref 10–52)
ANION GAP SERPL CALC-SCNC: 8 MMOL/L (ref 10–20)
AST SERPL W P-5'-P-CCNC: 23 U/L (ref 9–39)
BASOPHILS # BLD AUTO: 0.04 X10*3/UL (ref 0–0.1)
BASOPHILS NFR BLD AUTO: 0.8 %
BILIRUB SERPL-MCNC: 0.6 MG/DL (ref 0–1.2)
BUN SERPL-MCNC: 25 MG/DL (ref 6–23)
CALCIUM SERPL-MCNC: 9.3 MG/DL (ref 8.6–10.3)
CHLORIDE SERPL-SCNC: 103 MMOL/L (ref 98–107)
CHOLEST SERPL-MCNC: 126 MG/DL (ref 0–199)
CHOLESTEROL/HDL RATIO: 4.8
CO2 SERPL-SCNC: 32 MMOL/L (ref 21–32)
CREAT SERPL-MCNC: 1.58 MG/DL (ref 0.5–1.3)
EGFRCR SERPLBLD CKD-EPI 2021: 42 ML/MIN/1.73M*2
EOSINOPHIL # BLD AUTO: 0.11 X10*3/UL (ref 0–0.4)
EOSINOPHIL NFR BLD AUTO: 2.1 %
ERYTHROCYTE [DISTWIDTH] IN BLOOD BY AUTOMATED COUNT: 14.2 % (ref 11.5–14.5)
GLUCOSE SERPL-MCNC: 98 MG/DL (ref 74–99)
HCT VFR BLD AUTO: 42 % (ref 41–52)
HDLC SERPL-MCNC: 26.3 MG/DL
HGB BLD-MCNC: 13.6 G/DL (ref 13.5–17.5)
IMM GRANULOCYTES # BLD AUTO: 0.02 X10*3/UL (ref 0–0.5)
IMM GRANULOCYTES NFR BLD AUTO: 0.4 % (ref 0–0.9)
LDLC SERPL CALC-MCNC: 72 MG/DL
LYMPHOCYTES # BLD AUTO: 1.48 X10*3/UL (ref 0.8–3)
LYMPHOCYTES NFR BLD AUTO: 28.7 %
MCH RBC QN AUTO: 32 PG (ref 26–34)
MCHC RBC AUTO-ENTMCNC: 32.4 G/DL (ref 32–36)
MCV RBC AUTO: 99 FL (ref 80–100)
MONOCYTES # BLD AUTO: 0.43 X10*3/UL (ref 0.05–0.8)
MONOCYTES NFR BLD AUTO: 8.3 %
NEUTROPHILS # BLD AUTO: 3.08 X10*3/UL (ref 1.6–5.5)
NEUTROPHILS NFR BLD AUTO: 59.7 %
NON HDL CHOLESTEROL: 100 MG/DL (ref 0–149)
NRBC BLD-RTO: 0 /100 WBCS (ref 0–0)
PLATELET # BLD AUTO: 204 X10*3/UL (ref 150–450)
POTASSIUM SERPL-SCNC: 4.1 MMOL/L (ref 3.5–5.3)
PROT SERPL-MCNC: 7.6 G/DL (ref 6.4–8.2)
RBC # BLD AUTO: 4.25 X10*6/UL (ref 4.5–5.9)
SODIUM SERPL-SCNC: 139 MMOL/L (ref 136–145)
TRIGL SERPL-MCNC: 139 MG/DL (ref 0–149)
TSH SERPL-ACNC: 0.67 MIU/L (ref 0.44–3.98)
VLDL: 28 MG/DL (ref 0–40)
WBC # BLD AUTO: 5.2 X10*3/UL (ref 4.4–11.3)

## 2024-05-13 PROCEDURE — 84443 ASSAY THYROID STIM HORMONE: CPT

## 2024-05-13 PROCEDURE — 80061 LIPID PANEL: CPT

## 2024-05-13 PROCEDURE — 99213 OFFICE O/P EST LOW 20 MIN: CPT | Performed by: FAMILY MEDICINE

## 2024-05-13 PROCEDURE — 82306 VITAMIN D 25 HYDROXY: CPT

## 2024-05-13 PROCEDURE — 85025 COMPLETE CBC W/AUTO DIFF WBC: CPT

## 2024-05-13 PROCEDURE — 1159F MED LIST DOCD IN RCRD: CPT | Performed by: FAMILY MEDICINE

## 2024-05-13 PROCEDURE — 3074F SYST BP LT 130 MM HG: CPT | Performed by: FAMILY MEDICINE

## 2024-05-13 PROCEDURE — 36415 COLL VENOUS BLD VENIPUNCTURE: CPT

## 2024-05-13 PROCEDURE — 80053 COMPREHEN METABOLIC PANEL: CPT

## 2024-05-13 PROCEDURE — 1036F TOBACCO NON-USER: CPT | Performed by: FAMILY MEDICINE

## 2024-05-13 PROCEDURE — 3078F DIAST BP <80 MM HG: CPT | Performed by: FAMILY MEDICINE

## 2024-05-13 PROCEDURE — 1124F ACP DISCUSS-NO DSCNMKR DOCD: CPT | Performed by: FAMILY MEDICINE

## 2024-05-13 NOTE — PROGRESS NOTES
Continue Paxil  Add Buspar 10 mg qAM, may increase to BID if needed  She will reach out via [employer] in about 1 month with update   Subjective   Reason for Visit: Valentin Howe is an 89 y.o. male here for a 4 month follow up      Reviewed all medications by prescribing practitioner or clinical pharmacist (such as prescriptions, OTCs, herbal therapies and supplements) and documented in the medical record.    HPI  accompanied by wife,   ARMONDDA  - pt stopped all medication except levothyroxine, more often gets aggressive with swearing and occ throwing things    #) fall - went to the ER on 12/28/23  - nose fracture   - He stood up out of bed and stumbled and lost his balance and fell forward striking his nose.  He has a deformity to the nose.   - reviewed imaging +    #) Alzheimers dementia - is declining , can't remember well  - more sun downing   - confused about whose home his is in   - no hallucinations   - pt could not recall his age,   - could recall the season, might have been a good guess  - wife is getting worried about the future   - is getting help from 2 daughters who live locally   - no self harm behaviors  - Mild cognitive impairment with cerebrovascular disease   - carotid stenosis   - follows with Dr. Duong, Neuro about 1+ year ago and now with geriatric psychiatrist   - still doing ADLS  - on donepezil 10 mg daily - only taking 5 ...will add memantidine to the regiment -- STOPPED both of these a couple of weeks ago (weaned off)    #) Elevated PSA- urologist thinks it is ok  - no follow up needs, no longer checking PSA     #) Hypothyroidism   - is on the levothyroxine 50 mcg daily , getting every AM (chews it) but is taking daily.    Low appetite and weight loss  Reports minimal need for help dressing/bathing  No change in bowels or urination  Denies jennifer     Patient Care Team:  Avani Denis DO as PCP - General  Avani Denis DO as PCP - [employer] PCP  Tyler Granados MD (Internal Medicine)     Review of Systems  ROS was completed and all systems are negative with the exception of what was noted in the the  HPI.     Objective   Vitals:  /70   Pulse 69   Wt 58.5 kg (129 lb)   SpO2 97%   BMI 19.05 kg/m²       Physical Exam  GEN: A+O, no acute distress  HEENT: NC/AT, Oropharynx clear, no exudates, TM visualized, Extraoccular muscles intact, no facial droop; no thyromegaly or cervical LAD  RESP: CTAB, no wheezes   CV: RRR, no murmurs  ABD: soft, non-tender, + BS  SKIN: no rashes or bruising, no peripheral edema   NEURO: CN II-XII grossly intact, moves all extremities equally, no tremor   PSYCH: normal affect, appropriate mood     Assessment/Plan   Problem List Items Addressed This Visit    None  Please get updated blood work, ok if not fasting.     Call the geriatric psychiatrist for an assessment if you are interested     Continue to work on a good sleep scheduled and routine.     Blood pressure looks great today at 124/70     Weight is down 1#, make sure to get adequate fat and protein.     Follow up in 4 months for follow up or sooner as need.

## 2024-05-13 NOTE — PATIENT INSTRUCTIONS
Please get updated blood work, ok if not fasting.     Call the geriatric psychiatrist for an assessment if you are interested     Continue to work on a good sleep scheduled and routine.     Blood pressure looks great today at 124/70     Weight is down 1#, make sure to get adequate fat and protein.     Follow up in 4 months for follow up or sooner as need.

## 2024-05-14 LAB — 25(OH)D3 SERPL-MCNC: 30 NG/ML (ref 30–100)

## 2024-09-24 ENCOUNTER — APPOINTMENT (OUTPATIENT)
Dept: PRIMARY CARE | Facility: CLINIC | Age: 89
End: 2024-09-24
Payer: MEDICARE

## 2024-09-24 VITALS
SYSTOLIC BLOOD PRESSURE: 110 MMHG | HEIGHT: 68 IN | OXYGEN SATURATION: 93 % | WEIGHT: 129 LBS | DIASTOLIC BLOOD PRESSURE: 76 MMHG | HEART RATE: 67 BPM | BODY MASS INDEX: 19.55 KG/M2

## 2024-09-24 DIAGNOSIS — E72.11 HYPERHOMOCYSTEINEMIA (MULTI): ICD-10-CM

## 2024-09-24 DIAGNOSIS — Z00.00 ROUTINE GENERAL MEDICAL EXAMINATION AT HEALTH CARE FACILITY: Primary | ICD-10-CM

## 2024-09-24 DIAGNOSIS — E05.90 THYROTOXICOSIS, UNSPECIFIED WITHOUT THYROTOXIC CRISIS OR STORM: ICD-10-CM

## 2024-09-24 DIAGNOSIS — N18.31 CKD STAGE G3A/A1, GFR 45-59 AND ALBUMIN CREATININE RATIO <30 MG/G (MULTI): ICD-10-CM

## 2024-09-24 PROBLEM — R64 CACHECTIC (MULTI): Status: RESOLVED | Noted: 2023-06-27 | Resolved: 2024-09-24

## 2024-09-24 PROCEDURE — G0008 ADMIN INFLUENZA VIRUS VAC: HCPCS | Performed by: FAMILY MEDICINE

## 2024-09-24 PROCEDURE — 1159F MED LIST DOCD IN RCRD: CPT | Performed by: FAMILY MEDICINE

## 2024-09-24 PROCEDURE — 90662 IIV NO PRSV INCREASED AG IM: CPT | Performed by: FAMILY MEDICINE

## 2024-09-24 PROCEDURE — 1160F RVW MEDS BY RX/DR IN RCRD: CPT | Performed by: FAMILY MEDICINE

## 2024-09-24 PROCEDURE — 99397 PER PM REEVAL EST PAT 65+ YR: CPT | Performed by: FAMILY MEDICINE

## 2024-09-24 PROCEDURE — 1170F FXNL STATUS ASSESSED: CPT | Performed by: FAMILY MEDICINE

## 2024-09-24 PROCEDURE — 3078F DIAST BP <80 MM HG: CPT | Performed by: FAMILY MEDICINE

## 2024-09-24 PROCEDURE — 3074F SYST BP LT 130 MM HG: CPT | Performed by: FAMILY MEDICINE

## 2024-09-24 PROCEDURE — G0439 PPPS, SUBSEQ VISIT: HCPCS | Performed by: FAMILY MEDICINE

## 2024-09-24 RX ORDER — LEVOTHYROXINE SODIUM 50 UG/1
50 TABLET ORAL DAILY
Qty: 90 TABLET | Refills: 3 | Status: SHIPPED | OUTPATIENT
Start: 2024-09-24

## 2024-09-24 ASSESSMENT — PATIENT HEALTH QUESTIONNAIRE - PHQ9
SUM OF ALL RESPONSES TO PHQ9 QUESTIONS 1 AND 2: 0
2. FEELING DOWN, DEPRESSED OR HOPELESS: NOT AT ALL
1. LITTLE INTEREST OR PLEASURE IN DOING THINGS: NOT AT ALL

## 2024-09-24 ASSESSMENT — ACTIVITIES OF DAILY LIVING (ADL)
TAKING_MEDICATION: NEEDS ASSISTANCE
GROCERY_SHOPPING: INDEPENDENT
DRESSING: INDEPENDENT
DOING_HOUSEWORK: NEEDS ASSISTANCE
BATHING: INDEPENDENT
MANAGING_FINANCES: TOTAL CARE

## 2024-09-24 NOTE — PATIENT INSTRUCTIONS
5/13/24- Blood counts are stable, no evidence of anemia or infection. Sugar level is good, kidney function is still reduced, is stable from previous labs, make sure he stay hydrated. Normal liver and thyroid levels. Vitamin D is 30, which is low normal, so if you can add supplement too,  if not dont worry about it. Cholesterol is good.     Call the geriatric psychiatrist for an assessment if you are interested     Flu shot today    Continue to work on a good sleep scheduled and routine.     Glad the weight is stable.     Blood pressure looks great today at 110/76.    Follow up in 4 months for follow up or sooner as need.

## 2025-01-30 ENCOUNTER — APPOINTMENT (OUTPATIENT)
Dept: PRIMARY CARE | Facility: CLINIC | Age: 89
End: 2025-01-30
Payer: MEDICARE

## 2025-01-30 VITALS
OXYGEN SATURATION: 96 % | HEART RATE: 76 BPM | SYSTOLIC BLOOD PRESSURE: 130 MMHG | DIASTOLIC BLOOD PRESSURE: 70 MMHG | BODY MASS INDEX: 20.22 KG/M2 | WEIGHT: 133 LBS

## 2025-01-30 DIAGNOSIS — N18.32 CHRONIC KIDNEY DISEASE, STAGE 3B (MULTI): ICD-10-CM

## 2025-01-30 DIAGNOSIS — E55.9 AVITAMINOSIS D: ICD-10-CM

## 2025-01-30 DIAGNOSIS — E78.5 DYSLIPIDEMIA: Primary | ICD-10-CM

## 2025-01-30 DIAGNOSIS — F02.B3 MODERATE DEMENTIA ASSOCIATED WITH OTHER UNDERLYING DISEASE, WITH MOOD DISTURBANCE: ICD-10-CM

## 2025-01-30 DIAGNOSIS — N18.31 CKD STAGE G3A/A1, GFR 45-59 AND ALBUMIN CREATININE RATIO <30 MG/G (MULTI): ICD-10-CM

## 2025-01-30 DIAGNOSIS — E05.90 HYPERTHYROIDISM: ICD-10-CM

## 2025-01-30 DIAGNOSIS — I10 PRIMARY HYPERTENSION: ICD-10-CM

## 2025-01-30 PROCEDURE — 3078F DIAST BP <80 MM HG: CPT | Performed by: FAMILY MEDICINE

## 2025-01-30 PROCEDURE — 3075F SYST BP GE 130 - 139MM HG: CPT | Performed by: FAMILY MEDICINE

## 2025-01-30 PROCEDURE — 99213 OFFICE O/P EST LOW 20 MIN: CPT | Performed by: FAMILY MEDICINE

## 2025-01-30 PROCEDURE — 1036F TOBACCO NON-USER: CPT | Performed by: FAMILY MEDICINE

## 2025-01-30 PROCEDURE — G2211 COMPLEX E/M VISIT ADD ON: HCPCS | Performed by: FAMILY MEDICINE

## 2025-01-30 PROCEDURE — 1157F ADVNC CARE PLAN IN RCRD: CPT | Performed by: FAMILY MEDICINE

## 2025-01-30 PROCEDURE — 1159F MED LIST DOCD IN RCRD: CPT | Performed by: FAMILY MEDICINE

## 2025-01-30 ASSESSMENT — PATIENT HEALTH QUESTIONNAIRE - PHQ9
1. LITTLE INTEREST OR PLEASURE IN DOING THINGS: NOT AT ALL
2. FEELING DOWN, DEPRESSED OR HOPELESS: NOT AT ALL
SUM OF ALL RESPONSES TO PHQ9 QUESTIONS 1 AND 2: 0

## 2025-01-30 NOTE — PROGRESS NOTES
"Subjective   Valentin Howe \"Herb\" is a 89 y.o. male who presents for Follow-up (3 month follow up).    HPI  Medicare wellness in Sept 2024     accompanied by wife,   NKDA   continues to decline slowly, anger issues comes on daily, more often in the evening , passes pretty quickly   Bedtime is around 11p-2am  Had updated podiatry and dental visits   Had cataracts done   Baseline tinnitus     #) fall - went to the ER on 12/28/23- no additional falls   - nose fracture   - He stood up out of bed and stumbled and lost his balance and fell forward striking his nose.  He has a deformity to the nose.   - reviewed imaging +     #) Alzheimers dementia - is declining , can't remember well  - pt stopped all medication except levothyroxine, more often gets aggressive with swearing and occ throwing things  - refusing to take medications  - more sun downing   - confused about whose home his is in , doesn't recognize daughters. Etc.   - no hallucinations   - pt could not recall his age,   - could recall the season, might have been a good guess  - wife is getting worried about the future   - is getting help from 2 daughters who live locally   - no self harm behaviors  - Mild cognitive impairment with cerebrovascular disease   - carotid stenosis   - follows with Dr. Duong, Neuro about 1+ year ago and now with geriatric psychiatrist   - still doing ADLS  - on donepezil 10 mg daily - only taking 5 ...will add memantidine to the regiment -- STOPPED both of these (weaned off)     #) Elevated PSA- urologist thinks it is ok  - no follow up needs, no longer checking PSA      #) Hypothyroidism   - is on the levothyroxine 50 mcg daily , getting every AM (chews it) but is taking daily.     Low appetite and weight loss- weight is stable. Up 3#    Reports minimal need for help dressing/bathing  No change in bowels or urination  Denies rashes     ROS was completed and all systems are negative with the exception of what was noted in the the HPI. "     Objective     /70   Pulse 76   Wt 60.3 kg (133 lb)   SpO2 96%   BMI 20.22 kg/m²      Physical Exam  GEN: A+O, no acute distress  HEENT: NC/AT, Oropharynx clear, no exudates, TM visualized, Extraoccular muscles intact, no facial droop; no thyromegaly or cervical LAD  RESP: CTAB, no wheezes   CV: RRR, no murmurs  ABD: soft, non-tender, + BS  SKIN: no rashes or bruising, no peripheral edema   NEURO: CN II-XII grossly intact, moves all extremities equally, no tremor   PSYCH: smiles, looks to wife to reply to questions.     Assessment/Plan   Problem List Items Addressed This Visit    None    5/13/24 was last blood work, we reviewed   New orders placed, not due until MAY 2025. Get them fasting.     Continue regular eye and dental check ups     Call the geriatric psychiatrist for an assessment if you are interested     Continue to work on a good sleep scheduled and routine.     Glad the weight is stable. And actually went up 4#.     Blood pressure looks great today at 130/70.     Follow up in 4 months for follow up or sooner as need.          Avani Denis DO, MSMed, ABOM  [address]   [address]   [place] [address]  Ph. [phone]  Fx. [phone]

## 2025-01-30 NOTE — PATIENT INSTRUCTIONS
5/13/24 was last blood work, we reviewed   New orders placed, not due until MAY 2025. Get them fasting.     Continue regular eye and dental check ups     Call the geriatric psychiatrist for an assessment if you are interested     Continue to work on a good sleep scheduled and routine.     Glad the weight is stable. And actually went up 4#.     Blood pressure looks great today at 130/70.     Follow up in 4 months for follow up or sooner as need.

## 2025-03-17 ENCOUNTER — TELEPHONE (OUTPATIENT)
Dept: PRIMARY CARE | Facility: CLINIC | Age: 89
End: 2025-03-17
Payer: MEDICARE

## 2025-03-17 ENCOUNTER — CLINICAL SUPPORT (OUTPATIENT)
Dept: PRIMARY CARE | Facility: CLINIC | Age: 89
End: 2025-03-17
Payer: MEDICARE

## 2025-03-17 DIAGNOSIS — K04.7 TOOTH INFECTION: Primary | ICD-10-CM

## 2025-03-17 PROCEDURE — 96372 THER/PROPH/DIAG INJ SC/IM: CPT | Performed by: FAMILY MEDICINE

## 2025-03-17 RX ORDER — CEFTRIAXONE 1 G/1
1 INJECTION, POWDER, FOR SOLUTION INTRAMUSCULAR; INTRAVENOUS ONCE
Status: COMPLETED | OUTPATIENT
Start: 2025-03-17 | End: 2025-03-17

## 2025-03-17 RX ORDER — CEFTRIAXONE SODIUM 1 G/1
1 INJECTION, POWDER, FOR SOLUTION INTRAVENOUS ONCE
Status: SHIPPED | OUTPATIENT
Start: 2025-03-17

## 2025-03-17 RX ADMIN — CEFTRIAXONE 1 G: 1 INJECTION, POWDER, FOR SOLUTION INTRAMUSCULAR; INTRAVENOUS at 15:23

## 2025-03-17 NOTE — PROGRESS NOTES
Patient came in today to get Ceftriaxone 1 gram In left deltoid - tolerated well  NDC [id number]   Exp 9/26  Lot [id number]

## 2025-04-04 DIAGNOSIS — F02.B3 MODERATE DEMENTIA ASSOCIATED WITH OTHER UNDERLYING DISEASE, WITH MOOD DISTURBANCE: Primary | ICD-10-CM

## 2025-04-10 ENCOUNTER — APPOINTMENT (OUTPATIENT)
Dept: PHARMACY | Facility: HOSPITAL | Age: 89
End: 2025-04-10
Payer: MEDICARE

## 2025-04-10 DIAGNOSIS — F02.B3 MODERATE DEMENTIA ASSOCIATED WITH OTHER UNDERLYING DISEASE, WITH MOOD DISTURBANCE: ICD-10-CM

## 2025-04-10 RX ORDER — ARIPIPRAZOLE ORAL 1 MG/ML
5 SOLUTION ORAL DAILY
Qty: 150 ML | Refills: 0 | Status: SHIPPED | OUTPATIENT
Start: 2025-04-10

## 2025-04-10 NOTE — PROGRESS NOTES
"  Clinical Pharmacy Appointment    Patient ID: Valentin Howe \"Kiarra" is a 89 y.o. male who presents for Dementia.    Referring Provider: Avani Denis DO  PCP: Avani Denis DO   Last visit with PCP: 1/30/25   Next visit with PCP: 6/30/25    Subjective     Interval History  PMH dementia with mood disturbance. Virtual visit conducted with Valentin's wife and caregiver Sowmya. Sowmya takes care of Valentin at home. She has aid come to the home for 2 hours weekly.   Worsening dementia, periodic episodes of aggressive outbursts. Worse in evening - sundowning.  Patient has difficulty taking oral medications. He takes levothyroxine during morning routine (chews and takes a few sips of water)  Likes applesauce but does not accept it with crushed meds. Picky eater - cream of wheat in morning. Snacks on potato chips, cheese, chocolate. Likes diluted lemonade or tea.    ~ one year ago tried aripiprazole chewable tabs however patient refused to take. Previously he did not accept liquid medications however Sowmya reports he has been taking oral antibiotic solution without issue and would like to try aripiprazole solution.  Referred to geriatric psychiatry, has not scheduled  Tried CBD elixir in past , stopped due to lack of benefit     Medication Reconciliation:  Changed: None    Drug Interactions  No relevant drug interactions were noted.    Medication System Management  Patient's preferred pharmacy: [facility] (Devi Napier)  Adherence/Organization: Patient often refuses medications due to dementia; does chew synthroid daily in AM  Affordability/Accessibility: No barriers     Objective   No Known Allergies  Social History     Social History Narrative    Not on file      Medication Review  Current Outpatient Medications   Medication Instructions    ARIPiprazole (ABILIFY) 5 mg, oral, Daily    levothyroxine (SYNTHROID, LEVOXYL) 50 mcg, oral, Daily      Vitals  BP Readings from Last 2 Encounters:   01/30/25 130/70   09/24/24 110/76     BMI " "Readings from Last 1 Encounters:   01/30/25 20.22 kg/m²      Labs  A1C  No results found for: \"HGBA1C\"  BMP  Lab Results   Component Value Date    CALCIUM 9.3 05/13/2024     05/13/2024    K 4.1 05/13/2024    CO2 32 05/13/2024     05/13/2024    BUN 25 (H) 05/13/2024    CREATININE 1.58 (H) 05/13/2024    EGFR 42 (L) 05/13/2024     LFTs  Lab Results   Component Value Date    ALT 13 05/13/2024    AST 23 05/13/2024    ALKPHOS 68 05/13/2024    BILITOT 0.6 05/13/2024     FLP  Lab Results   Component Value Date    TRIG 139 05/13/2024    CHOL 126 05/13/2024    LDLF 104 (H) 01/26/2023    LDLCALC 72 05/13/2024    HDL 26.3 05/13/2024     Urine Microalbumin  No results found for: \"MICROALBCREA\"  Weight Management  Wt Readings from Last 3 Encounters:   01/30/25 60.3 kg (133 lb)   09/24/24 58.5 kg (129 lb)   05/13/24 58.5 kg (129 lb)      There is no height or weight on file to calculate BMI.     Assessment/Plan   Problem List Items Addressed This Visit       Moderate dementia associated with other underlying disease, with mood disturbance    Relevant Medications    ARIPiprazole (Abilify) 1 mg/mL solution   Start aripiprazole 5mg (5mL) oral solution once daily. Adjunct to non-pharmacologic measures.  Counseled patient on MOA, expectations, side effects, duration of therapy, administration, and monitoring parameters.  Addressed all of patients questions and concerns at time of appointment. Encouraged to reach out to PharmD with additional needs.      Clinical Pharmacist follow-up: 2 weeks, Telehealth visit      Continue all meds under the continuation of care with the referring provider and clinical pharmacy team.    Mariel Bernal, Franki  Clinical Pharmacy Specialist Primary Care  [phone]     Verbal consent to manage patient's drug therapy was obtained from an individual authorized to act on behalf of a patient. They were informed they may decline to participate or withdraw from participation in pharmacy services " at any time.

## 2025-04-16 ENCOUNTER — TELEPHONE (OUTPATIENT)
Dept: PRIMARY CARE | Facility: CLINIC | Age: 89
End: 2025-04-16
Payer: MEDICARE

## 2025-04-23 DIAGNOSIS — G30.1 SEVERE LATE ONSET ALZHEIMER'S DEMENTIA WITH AGITATION (MULTI): Primary | ICD-10-CM

## 2025-04-23 DIAGNOSIS — F02.C11 SEVERE LATE ONSET ALZHEIMER'S DEMENTIA WITH AGITATION (MULTI): Primary | ICD-10-CM

## 2025-04-24 ENCOUNTER — OFFICE VISIT (OUTPATIENT)
Dept: PRIMARY CARE | Facility: CLINIC | Age: 89
End: 2025-04-24
Payer: MEDICARE

## 2025-04-24 ENCOUNTER — APPOINTMENT (OUTPATIENT)
Dept: PHARMACY | Facility: HOSPITAL | Age: 89
End: 2025-04-24
Payer: MEDICARE

## 2025-04-24 VITALS — DIASTOLIC BLOOD PRESSURE: 74 MMHG | SYSTOLIC BLOOD PRESSURE: 122 MMHG

## 2025-04-24 DIAGNOSIS — F02.B3 MODERATE DEMENTIA ASSOCIATED WITH OTHER UNDERLYING DISEASE, WITH MOOD DISTURBANCE: Primary | ICD-10-CM

## 2025-04-24 DIAGNOSIS — M54.2 NECK PAIN ON LEFT SIDE: ICD-10-CM

## 2025-04-24 PROCEDURE — 3078F DIAST BP <80 MM HG: CPT | Performed by: FAMILY MEDICINE

## 2025-04-24 PROCEDURE — 3074F SYST BP LT 130 MM HG: CPT | Performed by: FAMILY MEDICINE

## 2025-04-24 PROCEDURE — G2211 COMPLEX E/M VISIT ADD ON: HCPCS | Performed by: FAMILY MEDICINE

## 2025-04-24 PROCEDURE — 1036F TOBACCO NON-USER: CPT | Performed by: FAMILY MEDICINE

## 2025-04-24 PROCEDURE — 99214 OFFICE O/P EST MOD 30 MIN: CPT | Performed by: FAMILY MEDICINE

## 2025-04-24 PROCEDURE — 1159F MED LIST DOCD IN RCRD: CPT | Performed by: FAMILY MEDICINE

## 2025-04-24 PROCEDURE — 1157F ADVNC CARE PLAN IN RCRD: CPT | Performed by: FAMILY MEDICINE

## 2025-04-24 RX ORDER — KETOROLAC TROMETHAMINE 15 MG/ML
15 INJECTION, SOLUTION INTRAMUSCULAR; INTRAVENOUS ONCE
Status: COMPLETED | OUTPATIENT
Start: 2025-04-24 | End: 2025-04-24

## 2025-04-24 RX ORDER — QUETIAPINE FUMARATE 25 MG/1
12.5 TABLET, FILM COATED ORAL NIGHTLY
Qty: 15 TABLET | Refills: 5 | Status: SHIPPED | OUTPATIENT
Start: 2025-04-24 | End: 2025-10-21

## 2025-04-24 RX ADMIN — KETOROLAC TROMETHAMINE 15 MG: 15 INJECTION, SOLUTION INTRAMUSCULAR; INTRAVENOUS at 15:06

## 2025-04-24 ASSESSMENT — ENCOUNTER SYMPTOMS: NECK PAIN: 1

## 2025-04-24 NOTE — PATIENT INSTRUCTIONS
Does not look like the neck pain is limiting movement.     We can give him a low dose ( renal dose) of toradol     Then you can give tylenol 650 mg every 8 hours if he will take it    Also try over the counter lidocaine or IcyHot/Bengay patches or cream     Try the Seroquel half tablet at bedtime.     BP today is good at 122/74    Follow up as scheduled, get updated fasting labs prior

## 2025-04-24 NOTE — PROGRESS NOTES
"Subjective   Valentin Howe \"Herb\" is a 89 y.o. male who presents for Neck Pain (Patient has left side neck pain x 3 days - has had multiple appointments to dentist in last month and given antibiotics due to tooth removal).    Neck Pain       Medicare wellness in Sept 2024     accompanied by wife,   NKDA   continues to decline slowly, anger issues comes on daily, more often in the evening , passes pretty quickly   Bedtime is around 11p-2am  Had updated podiatry and dental visits   Had cataracts done   Baseline tinnitus     #) neck pain - currently not endorsing pain   - he has dementia   - started to complain of left sided neck pain for the last 3 days     #) fx tooth- was extracted   - March 2025- gave him IM ceftriaxone     #) fall - went to the ER on 12/28/23- no additional falls   - nose fracture   - He stood up out of bed and stumbled and lost his balance and fell forward striking his nose.  He has a deformity to the nose.   - reviewed imaging +     #) Alzheimers dementia - is declining , can't remember well  - stopped showering and brushing his teeth  - working with pharm  - pt stopped all medication except levothyroxine, more often gets aggressive with swearing and occ throwing things  - refusing to take medications  - more sun downing   - confused about whose home his is in , doesn't recognize daughters. Etc.   - no hallucinations   - pt could not recall his age,   - could recall the season, might have been a good guess  - wife is getting worried about the future   - is getting help from 2 daughters who live locally   - no self harm behaviors  - Mild cognitive impairment with cerebrovascular disease   - carotid stenosis   - follows with Dr. Duong, Neuro about 1+ year ago and now with geriatric psychiatrist   - still doing ADLS  - on donepezil 10 mg daily - only taking 5 ...will add memantidine to the regiment -- STOPPED both of these (weaned off)     #) Elevated PSA- urologist thinks it is ok  - no follow up " needs, no longer checking PSA      #) Hypothyroidism   - is on the levothyroxine 50 mcg daily , getting every AM (chews it) but is taking daily.     Low appetite and weight loss- weight is stable. Up 3#    Reports minimal need for help dressing/bathing  No change in bowels or urination  Denies rashes     ROS was completed and all systems are negative with the exception of what was noted in the the HPI.     Objective     /74      Physical Exam  GEN: A+O, no acute distress  HEENT: NC/AT, Oropharynx clear, no exudates, TM visualized, Extraoccular muscles intact, no facial droop; no thyromegaly or cervical LAD  RESP: CTAB, no wheezes   CV: RRR, no murmurs  ABD: soft, non-tender, + BS  SKIN: no rashes or bruising, no peripheral edema   NEURO: CN II-XII grossly intact, moves all extremities equally, no tremor   PSYCH: smiles, looks to wife to reply to questions.     Assessment/Plan   Problem List Items Addressed This Visit    None    Does not look like the neck pain is limiting movement.     We can give him a low dose ( renal dose) of toradol     Then you can give tylenol 650 mg every 8 hours if he will take it    Also try over the counter lidocaine or IcyHot/Bengay patches or cream     Try the Seroquel half tablet at bedtime.     BP today is good at 122/74    Follow up as scheduled, get updated fasting labs prior          Avani Denis DO, MSMed, ABOM  [address]   [address]   [place] [address]  Ph. [phone]  Fx. [phone]

## 2025-05-06 ENCOUNTER — HOME HEALTH ADMISSION (OUTPATIENT)
Dept: HOME HEALTH SERVICES | Facility: HOME HEALTH | Age: 89
End: 2025-05-06

## 2025-05-08 ENCOUNTER — TELEMEDICINE (OUTPATIENT)
Dept: PHARMACY | Facility: HOSPITAL | Age: 89
End: 2025-05-08
Payer: MEDICARE

## 2025-05-08 ENCOUNTER — APPOINTMENT (OUTPATIENT)
Dept: PRIMARY CARE | Facility: CLINIC | Age: 89
End: 2025-05-08
Payer: MEDICARE

## 2025-05-08 DIAGNOSIS — F02.B3 MODERATE DEMENTIA ASSOCIATED WITH OTHER UNDERLYING DISEASE, WITH MOOD DISTURBANCE: ICD-10-CM

## 2025-05-08 NOTE — PROGRESS NOTES
"  Clinical Pharmacy Appointment    Patient ID: Valentin Howe \"Kiarra" is a 89 y.o. male who presents for Dementia.    Referring Provider: Avani Denis DO  PCP: Avani Denis DO   Last visit with PCP: 1/30/25   Next visit with PCP: 6/30/25    Subjective     Interval History  PMH dementia with mood disturbance. Virtual visit conducted with Valentin's wife and caregiver Sowmya. Sowmya takes care of Valentin at home. She has aid come to the home for 2 hours weekly.   Worsening dementia, periodic episodes of aggressive outbursts. Worse in evening - sundowning.  Patient has difficulty taking oral medications. He takes levothyroxine during morning routine (chews and takes a few sips of water)  Since last visit, started seroquel 12.5mg crushed in applesauce with dinner. Reports this has been helpful for reducing aggressive behavior in the evening.   Picky eater - cream of wheat in morning. Snacks on potato chips, cheese, chocolate. Likes diluted lemonade or tea.  Referred to geriatric psychiatry, has not scheduled  Tried CBD elixir in past , stopped due to lack of benefit     Medication Reconciliation:  Changed: None    Drug Interactions  No relevant drug interactions were noted.    Medication System Management  Patient's preferred pharmacy: [facility] ([facility])  Adherence/Organization: Patient often refuses medications due to dementia; does chew synthroid daily in AM  Affordability/Accessibility: No barriers     Objective   No Known Allergies  Social History     Social History Narrative    Not on file      Medication Review  Current Outpatient Medications   Medication Instructions    ARIPiprazole (ABILIFY) 5 mg, oral, Daily    levothyroxine (SYNTHROID, LEVOXYL) 50 mcg, oral, Daily    QUEtiapine (SEROQUEL) 12.5 mg, oral, Nightly      Vitals  BP Readings from Last 2 Encounters:   04/24/25 122/74   01/30/25 130/70     BMI Readings from Last 1 Encounters:   01/30/25 20.22 kg/m²      Labs  Lab Results   Component Value Date    " CALCIUM 9.3 05/13/2024     05/13/2024    K 4.1 05/13/2024    CO2 32 05/13/2024     05/13/2024    BUN 25 (H) 05/13/2024    CREATININE 1.58 (H) 05/13/2024    EGFR 42 (L) 05/13/2024     Lab Results   Component Value Date    ALT 13 05/13/2024    AST 23 05/13/2024    ALKPHOS 68 05/13/2024    BILITOT 0.6 05/13/2024     Lab Results   Component Value Date    TRIG 139 05/13/2024    CHOL 126 05/13/2024    LDLF 104 (H) 01/26/2023    LDLCALC 72 05/13/2024    HDL 26.3 05/13/2024     Wt Readings from Last 3 Encounters:   01/30/25 60.3 kg (133 lb)   09/24/24 58.5 kg (129 lb)   05/13/24 58.5 kg (129 lb)      There is no height or weight on file to calculate BMI.     Assessment/Plan   Problem List Items Addressed This Visit       Moderate dementia associated with other underlying disease, with mood disturbance     Continue quetiapine 12.5mg once daily at dinner time. Takes crushed in applesauce. Goal to use smallest effective dose and as adjunct to non-pharmacologic measures.   Counseled patient on MOA, expectations, side effects, duration of therapy, administration, and monitoring parameters.  Addressed all of patients questions and concerns at time of appointment. Encouraged to reach out to PharmD with additional needs.      Clinical Pharmacist follow-up: as needed Telehealth visit      Continue all meds under the continuation of care with the referring provider and clinical pharmacy team.    Mariel Bernal, PharmD  Clinical Pharmacy Specialist Primary Care  [phone]     Verbal consent to manage patient's drug therapy was obtained from an individual authorized to act on behalf of a patient. They were informed they may decline to participate or withdraw from participation in pharmacy services at any time.

## 2025-05-13 ENCOUNTER — HOME CARE VISIT (OUTPATIENT)
Dept: HOME HEALTH SERVICES | Facility: HOME HEALTH | Age: 89
End: 2025-05-13

## 2025-05-13 ASSESSMENT — ACTIVITIES OF DAILY LIVING (ADL)
AMBULATION ASSISTANCE: 1
LAUNDRY ASSESSED: 1
ORAL_CARE_CURRENT_FUNCTION: NEEDS ASSISTANCE
DRESSING_LB_CURRENT_FUNCTION: MINIMUM ASSIST
TELEPHONE USE ASSESSED: 1
GROOMING ASSESSED: 1
BATHING ASSESSED: 1
PREPARING MEALS: DEPENDENT
TRANSPORTATION ASSESSED: 1
AMBULATION ASSISTANCE: SUPERVISION
ORAL_CARE_ASSESSED: 1
DRESSING_UB_CURRENT_FUNCTION: MINIMUM ASSIST
FEEDING ASSESSED: 1
HOUSEKEEPING ASSESSED: 1
SHOPPING ASSESSED: 1
LIGHT HOUSEKEEPING: DEPENDENT
TRANSPORTATION: DEPENDENT
LAUNDRY: DEPENDENT
FEEDING: CONTACT GUARD ASSIST
USING THE TELPHONE: DEPENDENT
TOILETING: 1
GROOMING_CURRENT_FUNCTION: MINIMUM ASSIST
SHOPPING: DEPENDENT
BATHING_CURRENT_FUNCTION: MODERATE ASSIST
TOILETING: CONTACT GUARD ASSIST

## 2025-05-19 ENCOUNTER — HOME CARE VISIT (OUTPATIENT)
Dept: HOME HEALTH SERVICES | Facility: HOME HEALTH | Age: 89
End: 2025-05-19

## 2025-05-19 DIAGNOSIS — F02.B3 MODERATE DEMENTIA ASSOCIATED WITH OTHER UNDERLYING DISEASE, WITH MOOD DISTURBANCE: Primary | ICD-10-CM

## 2025-05-19 DIAGNOSIS — R45.4 IRRITABILITY AND ANGER: ICD-10-CM

## 2025-05-19 PROCEDURE — G0156 HHCP-SVS OF AIDE,EA 15 MIN: HCPCS

## 2025-05-21 ENCOUNTER — HOME CARE VISIT (OUTPATIENT)
Dept: HOME HEALTH SERVICES | Facility: HOME HEALTH | Age: 89
End: 2025-05-21

## 2025-05-21 PROCEDURE — G0156 HHCP-SVS OF AIDE,EA 15 MIN: HCPCS

## 2025-05-22 ENCOUNTER — TELEMEDICINE (OUTPATIENT)
Dept: PHARMACY | Facility: HOSPITAL | Age: 89
End: 2025-05-22
Payer: MEDICARE

## 2025-05-22 DIAGNOSIS — R45.4 IRRITABILITY AND ANGER: ICD-10-CM

## 2025-05-22 DIAGNOSIS — F02.B3 MODERATE DEMENTIA ASSOCIATED WITH OTHER UNDERLYING DISEASE, WITH MOOD DISTURBANCE: ICD-10-CM

## 2025-05-22 NOTE — PROGRESS NOTES
"  Clinical Pharmacy Appointment    Patient ID: Valentin Howe \"Kiarra" is a 89 y.o. male who presents for Dementia.    Referring Provider: Avani Denis DO  PCP: Avani Denis DO   Last visit with PCP: 1/30/25   Next visit with PCP: 6/30/25    Subjective     Interval History  PMH dementia with mood disturbance. Virtual visit conducted with Valentin's wife and caregiver Sowmya. Sowmya takes care of Valentin at home. She has aid come to the home for 2 hours weekly.   Worsening dementia, periodic episodes of aggressive outbursts. Worse in evening - sundowning.  Patient has difficulty taking oral medications. He takes levothyroxine during morning routine (chews and takes a few sips of water)  Two weeks ago started seroquel 12.5mg crushed in applesauce with dinner. Reports this has been helpful for reducing aggressive behavior in the evening, effects starting ~ 1 hour after taking medication. Notes calmer demeanor into the next day with reduced frequency of outbursts. No next day grogginess. He has increased appetite. Caregiver has some difficulty splitting tablets in half , asking if okay to crush.  Referred to geriatric psychiatry, has not scheduled  Tried CBD elixir in past , stopped due to lack of benefit     Medication Reconciliation:  Changed: None    Drug Interactions  No relevant drug interactions were noted.    Medication System Management  Patient's preferred pharmacy: [facility] ([facility])  Adherence/Organization: Patient often refuses medications due to dementia; does chew synthroid daily in AM  Affordability/Accessibility: No barriers     Objective   No Known Allergies  Social History     Social History Narrative    Not on file      Medication Review  Current Outpatient Medications   Medication Instructions    levothyroxine (SYNTHROID, LEVOXYL) 50 mcg, oral, Daily    QUEtiapine (SEROQUEL) 12.5 mg, oral, Nightly      Vitals  BP Readings from Last 2 Encounters:   04/24/25 122/74   01/30/25 130/70     BMI Readings from " Last 1 Encounters:   01/30/25 20.22 kg/m²      Labs  Lab Results   Component Value Date    CALCIUM 9.3 05/13/2024     05/13/2024    K 4.1 05/13/2024    CO2 32 05/13/2024     05/13/2024    BUN 25 (H) 05/13/2024    CREATININE 1.58 (H) 05/13/2024    EGFR 42 (L) 05/13/2024     Lab Results   Component Value Date    ALT 13 05/13/2024    AST 23 05/13/2024    ALKPHOS 68 05/13/2024    BILITOT 0.6 05/13/2024     Lab Results   Component Value Date    TRIG 139 05/13/2024    CHOL 126 05/13/2024    LDLF 104 (H) 01/26/2023    LDLCALC 72 05/13/2024    HDL 26.3 05/13/2024     Wt Readings from Last 3 Encounters:   01/30/25 60.3 kg (133 lb)   09/24/24 58.5 kg (129 lb)   05/13/24 58.5 kg (129 lb)      There is no height or weight on file to calculate BMI.     Assessment/Plan   Problem List Items Addressed This Visit       Moderate dementia associated with other underlying disease, with mood disturbance     Other Visit Diagnoses         Irritability and anger              Continue quetiapine 12.5mg once daily at dinner time. Okay to crush tablets and put half amount into applesauce. Goal to use smallest effective dose to reduce risk of adverse events and as adjunct to non-pharmacologic measures. Tolerating medication well with positive benefit in reduction of aggressive outbursts.   Counseled patient on MOA, expectations, side effects, duration of therapy, administration, and monitoring parameters.  Addressed all of patients questions and concerns at time of appointment. Encouraged to reach out to PharmD with additional needs.      Clinical Pharmacist follow-up: as needed Telehealth visit      Continue all meds under the continuation of care with the referring provider and clinical pharmacy team.    Mariel Bernal, Franki  Clinical Pharmacy Specialist Primary Care  [phone]     Verbal consent to manage patient's drug therapy was obtained from an individual authorized to act on behalf of a patient. They were informed  they may decline to participate or withdraw from participation in pharmacy services at any time.

## 2025-05-28 ENCOUNTER — HOME CARE VISIT (OUTPATIENT)
Dept: HOME HEALTH SERVICES | Facility: HOME HEALTH | Age: 89
End: 2025-05-28

## 2025-05-28 PROCEDURE — G0156 HHCP-SVS OF AIDE,EA 15 MIN: HCPCS

## 2025-06-02 ENCOUNTER — HOME CARE VISIT (OUTPATIENT)
Dept: HOME HEALTH SERVICES | Facility: HOME HEALTH | Age: 89
End: 2025-06-02

## 2025-06-02 PROCEDURE — G0156 HHCP-SVS OF AIDE,EA 15 MIN: HCPCS

## 2025-06-04 ENCOUNTER — HOME CARE VISIT (OUTPATIENT)
Dept: HOME HEALTH SERVICES | Facility: HOME HEALTH | Age: 89
End: 2025-06-04

## 2025-06-04 PROCEDURE — G0156 HHCP-SVS OF AIDE,EA 15 MIN: HCPCS

## 2025-06-09 ENCOUNTER — HOME CARE VISIT (OUTPATIENT)
Dept: HOME HEALTH SERVICES | Facility: HOME HEALTH | Age: 89
End: 2025-06-09

## 2025-06-09 PROCEDURE — G0156 HHCP-SVS OF AIDE,EA 15 MIN: HCPCS

## 2025-06-11 ENCOUNTER — HOME CARE VISIT (OUTPATIENT)
Dept: HOME HEALTH SERVICES | Facility: HOME HEALTH | Age: 89
End: 2025-06-11

## 2025-06-11 PROCEDURE — G0156 HHCP-SVS OF AIDE,EA 15 MIN: HCPCS

## 2025-06-16 ENCOUNTER — HOME CARE VISIT (OUTPATIENT)
Dept: HOME HEALTH SERVICES | Facility: HOME HEALTH | Age: 89
End: 2025-06-16

## 2025-06-16 PROCEDURE — G0156 HHCP-SVS OF AIDE,EA 15 MIN: HCPCS

## 2025-06-18 ENCOUNTER — HOME CARE VISIT (OUTPATIENT)
Dept: HOME HEALTH SERVICES | Facility: HOME HEALTH | Age: 89
End: 2025-06-18

## 2025-06-18 PROCEDURE — G0156 HHCP-SVS OF AIDE,EA 15 MIN: HCPCS

## 2025-06-23 ENCOUNTER — HOME CARE VISIT (OUTPATIENT)
Dept: HOME HEALTH SERVICES | Facility: HOME HEALTH | Age: 89
End: 2025-06-23

## 2025-06-23 PROCEDURE — G0156 HHCP-SVS OF AIDE,EA 15 MIN: HCPCS

## 2025-06-25 ENCOUNTER — HOME CARE VISIT (OUTPATIENT)
Dept: HOME HEALTH SERVICES | Facility: HOME HEALTH | Age: 89
End: 2025-06-25

## 2025-06-25 PROCEDURE — G0156 HHCP-SVS OF AIDE,EA 15 MIN: HCPCS

## 2025-06-30 ENCOUNTER — APPOINTMENT (OUTPATIENT)
Dept: PRIMARY CARE | Facility: CLINIC | Age: 89
End: 2025-06-30
Payer: MEDICARE

## 2025-07-02 ENCOUNTER — HOME CARE VISIT (OUTPATIENT)
Dept: HOME HEALTH SERVICES | Facility: HOME HEALTH | Age: 89
End: 2025-07-02

## 2025-07-02 PROCEDURE — G0156 HHCP-SVS OF AIDE,EA 15 MIN: HCPCS

## 2025-07-07 ENCOUNTER — APPOINTMENT (OUTPATIENT)
Dept: HOME HEALTH SERVICES | Facility: HOME HEALTH | Age: 89
End: 2025-07-07

## 2025-07-07 PROCEDURE — G0156 HHCP-SVS OF AIDE,EA 15 MIN: HCPCS

## 2025-07-09 ENCOUNTER — APPOINTMENT (OUTPATIENT)
Dept: HOME HEALTH SERVICES | Facility: HOME HEALTH | Age: 89
End: 2025-07-09

## 2025-07-09 PROCEDURE — G0156 HHCP-SVS OF AIDE,EA 15 MIN: HCPCS

## 2025-07-10 ENCOUNTER — APPOINTMENT (OUTPATIENT)
Dept: PRIMARY CARE | Facility: CLINIC | Age: 89
End: 2025-07-10
Payer: MEDICARE

## 2025-07-10 VITALS — SYSTOLIC BLOOD PRESSURE: 116 MMHG | DIASTOLIC BLOOD PRESSURE: 74 MMHG | BODY MASS INDEX: 20.22 KG/M2 | WEIGHT: 133 LBS

## 2025-07-10 DIAGNOSIS — R63.4 WEIGHT LOSS, UNINTENTIONAL: ICD-10-CM

## 2025-07-10 DIAGNOSIS — I10 PRIMARY HYPERTENSION: Primary | ICD-10-CM

## 2025-07-10 DIAGNOSIS — F02.B3 MODERATE DEMENTIA ASSOCIATED WITH OTHER UNDERLYING DISEASE, WITH MOOD DISTURBANCE: ICD-10-CM

## 2025-07-10 DIAGNOSIS — E03.9 ACQUIRED HYPOTHYROIDISM: ICD-10-CM

## 2025-07-10 PROCEDURE — G2211 COMPLEX E/M VISIT ADD ON: HCPCS | Performed by: FAMILY MEDICINE

## 2025-07-10 PROCEDURE — 3078F DIAST BP <80 MM HG: CPT | Performed by: FAMILY MEDICINE

## 2025-07-10 PROCEDURE — 1036F TOBACCO NON-USER: CPT | Performed by: FAMILY MEDICINE

## 2025-07-10 PROCEDURE — 1159F MED LIST DOCD IN RCRD: CPT | Performed by: FAMILY MEDICINE

## 2025-07-10 PROCEDURE — 99213 OFFICE O/P EST LOW 20 MIN: CPT | Performed by: FAMILY MEDICINE

## 2025-07-10 PROCEDURE — 3074F SYST BP LT 130 MM HG: CPT | Performed by: FAMILY MEDICINE

## 2025-07-10 ASSESSMENT — ENCOUNTER SYMPTOMS: NECK PAIN: 1

## 2025-07-10 NOTE — PATIENT INSTRUCTIONS
Try to make the bath fun with water guns and bubble, as a way to get him to bathe?     Glad to see the weight is stable.     Continue to follow up with Mariel for the dosing of the seroquel at bedtime.     BP today is good at 116/74, continue to work on good hydration.     Follow up in 3 months for the medicare wellness or sooner as needed

## 2025-07-10 NOTE — PROGRESS NOTES
"Subjective   Valentin Howe \"Herb\" is a 89 y.o. male who presents for Follow-up (4 month follow up).    Medicare wellness in Sept 2024 , next visit,     accompanied by wife,   NKDA   continues to decline slowly, anger issues comes on daily, more often in the evening , passes pretty quickly   Bedtime is around 11p-2am  Had updated podiatry and dental visits   Had cataracts done , does not wear glasses   Baseline tinnitus      #) fx tooth- was extracted   - March 2025- gave him IM ceftriaxone     #) fall - went to the ER on 12/28/23- no additional falls   - nose fracture   - He stood up out of bed and stumbled and lost his balance and fell forward striking his nose.  He has a deformity to the nose.   - reviewed imaging +     #) Alzheimers dementia - is declining , can't remember well  - stopped showering and brushing his teeth  - working with pharm, seroquel helps with mood   - pt stopped all medication except levothyroxine, more often gets aggressive with swearing and occ throwing things  - refusing to take medications  - more sun downing   - confused about whose home his is in , doesn't recognize daughters. Etc.   - no hallucinations   - pt could not recall his age,   - could recall the season, might have been a good guess  - wife is getting worried about the future   - is getting help from 2 daughters who live locally , now gets HomeHealth  - no self harm behaviors  - carotid stenosis   - follows with Dr. Duong, Neuro about 1+ year ago and now with geriatric psychiatrist   - still doing ADLS  - on donepezil 10 mg daily - only taking 5 ...will add memantidine to the regiment -- STOPPED both of these (weaned off)     #) Elevated PSA- urologist thinks it is ok  - no follow up needs, no longer checking PSA      #) Hypothyroidism   - is on the levothyroxine 50 mcg daily , getting every AM (chews it) but is taking daily.     Low appetite and weight loss- weight is stable.   Reports minimal need for help " dressing/bathing  No change in bowels or urination  Denies rashes     ROS was completed and all systems are negative with the exception of what was noted in the the HPI.     Objective     /74   Wt 60.3 kg (133 lb)   BMI 20.22 kg/m²      Physical Exam  GEN: A+O, no acute distress  HEENT: NC/AT, Oropharynx clear, no exudates, TM visualized, Extraoccular muscles intact, no facial droop; no thyromegaly or cervical LAD  RESP: CTAB, no wheezes   CV: RRR, no murmurs  ABD: soft, non-tender, + BS  SKIN: no rashes or bruising, no peripheral edema   NEURO: CN II-XII grossly intact, moves all extremities equally, no tremor   PSYCH: smiles, looks to wife to reply to questions.     Assessment/Plan   Problem List Items Addressed This Visit    None    Try to make the bath fun with water guns and bubble, as a way to get him to bathe?     Glad to see the weight is stable.     Continue to follow up with Mariel for the dosing of the seroquel at bedtime.     BP today is good at 116/74, continue to work on good hydration.     Follow up in 3 months for the medicare wellness or sooner as needed          Avani Denis DO, MSMed, ABOM  [address]   [address]   [place] [address]  Ph. [phone]  Fx. [phone]

## 2025-07-14 ENCOUNTER — APPOINTMENT (OUTPATIENT)
Dept: HOME HEALTH SERVICES | Facility: HOME HEALTH | Age: 89
End: 2025-07-14

## 2025-07-14 PROCEDURE — G0156 HHCP-SVS OF AIDE,EA 15 MIN: HCPCS

## 2025-07-16 ENCOUNTER — APPOINTMENT (OUTPATIENT)
Dept: HOME HEALTH SERVICES | Facility: HOME HEALTH | Age: 89
End: 2025-07-16

## 2025-07-16 PROCEDURE — G0156 HHCP-SVS OF AIDE,EA 15 MIN: HCPCS

## 2025-07-21 ENCOUNTER — APPOINTMENT (OUTPATIENT)
Dept: HOME HEALTH SERVICES | Facility: HOME HEALTH | Age: 89
End: 2025-07-21

## 2025-07-22 ENCOUNTER — HOME CARE VISIT (OUTPATIENT)
Dept: HOME HEALTH SERVICES | Facility: HOME HEALTH | Age: 89
End: 2025-07-22

## 2025-07-23 ENCOUNTER — APPOINTMENT (OUTPATIENT)
Dept: HOME HEALTH SERVICES | Facility: HOME HEALTH | Age: 89
End: 2025-07-23

## 2025-07-27 DIAGNOSIS — F02.B3 MODERATE DEMENTIA ASSOCIATED WITH OTHER UNDERLYING DISEASE, WITH MOOD DISTURBANCE: Primary | ICD-10-CM

## 2025-07-27 DIAGNOSIS — N18.32 CHRONIC KIDNEY DISEASE, STAGE 3B (MULTI): ICD-10-CM

## 2025-07-27 DIAGNOSIS — H90.3 SENSORINEURAL HEARING LOSS, BILATERAL: ICD-10-CM

## 2025-07-28 ENCOUNTER — APPOINTMENT (OUTPATIENT)
Dept: HOME HEALTH SERVICES | Facility: HOME HEALTH | Age: 89
End: 2025-07-28

## 2025-07-28 PROCEDURE — G0156 HHCP-SVS OF AIDE,EA 15 MIN: HCPCS

## 2025-07-30 ENCOUNTER — APPOINTMENT (OUTPATIENT)
Dept: HOME HEALTH SERVICES | Facility: HOME HEALTH | Age: 89
End: 2025-07-30

## 2025-07-30 PROCEDURE — G0156 HHCP-SVS OF AIDE,EA 15 MIN: HCPCS

## 2025-08-04 ENCOUNTER — TELEPHONE (OUTPATIENT)
Dept: PRIMARY CARE | Facility: CLINIC | Age: 89
End: 2025-08-04

## 2025-08-04 PROCEDURE — G0156 HHCP-SVS OF AIDE,EA 15 MIN: HCPCS

## 2025-08-04 NOTE — TELEPHONE ENCOUNTER
Patient referred to  [facility] program via Dr. Avani Denis, Moderate Dementia associated with other underlying disease, with mood disturbance. Sensorineural hearing loss, bilateral; Chronic kidney disease, stage 3b.  Patient resides at home with his wife, difficulty getting to [facility] office for visits.  [facility] will reach out to patient and/or family member in effort to establish contact and offer services.

## 2025-08-04 NOTE — TELEPHONE ENCOUNTER
Appointment scheduled 9/8/25 with Kaylen Rodriguez NP, 1:30 pm via phone call with patient wife Maya.

## 2025-08-11 ENCOUNTER — APPOINTMENT (OUTPATIENT)
Dept: HOME HEALTH SERVICES | Facility: HOME HEALTH | Age: 89
End: 2025-08-11

## 2025-08-11 PROCEDURE — G0156 HHCP-SVS OF AIDE,EA 15 MIN: HCPCS

## 2025-08-13 ENCOUNTER — APPOINTMENT (OUTPATIENT)
Dept: HOME HEALTH SERVICES | Facility: HOME HEALTH | Age: 89
End: 2025-08-13

## 2025-08-13 PROCEDURE — G0156 HHCP-SVS OF AIDE,EA 15 MIN: HCPCS

## 2025-08-18 ENCOUNTER — APPOINTMENT (OUTPATIENT)
Dept: HOME HEALTH SERVICES | Facility: HOME HEALTH | Age: 89
End: 2025-08-18

## 2025-08-18 PROCEDURE — G0156 HHCP-SVS OF AIDE,EA 15 MIN: HCPCS

## 2025-08-19 DIAGNOSIS — F02.B3 MODERATE DEMENTIA ASSOCIATED WITH OTHER UNDERLYING DISEASE, WITH MOOD DISTURBANCE: Primary | ICD-10-CM

## 2025-08-19 DIAGNOSIS — R45.4 IRRITABILITY AND ANGER: ICD-10-CM

## 2025-08-20 ENCOUNTER — TELEMEDICINE (OUTPATIENT)
Dept: PHARMACY | Facility: HOSPITAL | Age: 89
End: 2025-08-20
Payer: MEDICARE

## 2025-08-20 ENCOUNTER — APPOINTMENT (OUTPATIENT)
Dept: HOME HEALTH SERVICES | Facility: HOME HEALTH | Age: 89
End: 2025-08-20

## 2025-08-20 DIAGNOSIS — F02.B3 MODERATE DEMENTIA ASSOCIATED WITH OTHER UNDERLYING DISEASE, WITH MOOD DISTURBANCE: ICD-10-CM

## 2025-08-20 DIAGNOSIS — R45.4 IRRITABILITY AND ANGER: ICD-10-CM

## 2025-08-20 PROCEDURE — G0156 HHCP-SVS OF AIDE,EA 15 MIN: HCPCS

## 2025-08-20 RX ORDER — OLANZAPINE 2.5 MG/1
2.5 TABLET, FILM COATED ORAL DAILY
Qty: 30 TABLET | Refills: 0 | Status: SHIPPED | OUTPATIENT
Start: 2025-08-20 | End: 2025-09-19

## 2025-08-25 ENCOUNTER — APPOINTMENT (OUTPATIENT)
Dept: HOME HEALTH SERVICES | Facility: HOME HEALTH | Age: 89
End: 2025-08-25

## 2025-08-25 PROCEDURE — G0156 HHCP-SVS OF AIDE,EA 15 MIN: HCPCS

## 2025-08-27 ENCOUNTER — APPOINTMENT (OUTPATIENT)
Dept: HOME HEALTH SERVICES | Facility: HOME HEALTH | Age: 89
End: 2025-08-27

## 2025-08-27 PROCEDURE — G0156 HHCP-SVS OF AIDE,EA 15 MIN: HCPCS

## 2025-09-02 ENCOUNTER — HOME CARE VISIT (OUTPATIENT)
Dept: HOME HEALTH SERVICES | Facility: HOME HEALTH | Age: 89
End: 2025-09-02

## 2025-09-02 ASSESSMENT — ACTIVITIES OF DAILY LIVING (ADL)
PHYSICAL TRANSFERS ASSESSED: 1
BATHING ASSESSED: 1
LAUNDRY: DEPENDENT
TELEPHONE USE ASSESSED: 1
AMBULATION ASSISTANCE: SUPERVISION
TOILETING: SUPERVISION
GROOMING_CURRENT_FUNCTION: MODERATE ASSIST
TOILETING: 1
TRANSPORTATION: DEPENDENT
USING THE TELPHONE: DEPENDENT
SHOPPING: DEPENDENT
ORAL_CARE_CURRENT_FUNCTION: DEPENDENT
BATHING_CURRENT_FUNCTION: MAXIMUM ASSIST
TRANSPORTATION ASSESSED: 1
LAUNDRY ASSESSED: 1
FEEDING ASSESSED: 1
ORAL_CARE_ASSESSED: 1
DRESSING_LB_CURRENT_FUNCTION: CONTACT GUARD ASSIST
FEEDING: SUPERVISION
PREPARING MEALS: DEPENDENT
HOUSEKEEPING ASSESSED: 1
SHOPPING ASSESSED: 1
AMBULATION ASSISTANCE: 1
CURRENT_FUNCTION: SUPERVISION
DRESSING_UB_CURRENT_FUNCTION: STAND BY ASSIST
GROOMING ASSESSED: 1
LIGHT HOUSEKEEPING: DEPENDENT

## 2025-09-03 ENCOUNTER — HOME CARE VISIT (OUTPATIENT)
Dept: HOME HEALTH SERVICES | Facility: HOME HEALTH | Age: 89
End: 2025-09-03

## 2025-09-03 PROCEDURE — G0156 HHCP-SVS OF AIDE,EA 15 MIN: HCPCS

## 2025-09-05 ENCOUNTER — TELEPHONE (OUTPATIENT)
Dept: PRIMARY CARE | Facility: CLINIC | Age: 89
End: 2025-09-05
Payer: MEDICARE

## 2025-10-28 ENCOUNTER — APPOINTMENT (OUTPATIENT)
Dept: PRIMARY CARE | Facility: CLINIC | Age: 89
End: 2025-10-28
Payer: MEDICARE

## 2025-11-07 ENCOUNTER — APPOINTMENT (OUTPATIENT)
Dept: PRIMARY CARE | Facility: CLINIC | Age: 89
End: 2025-11-07
Payer: MEDICARE